# Patient Record
Sex: FEMALE | Race: WHITE | NOT HISPANIC OR LATINO | Employment: OTHER | URBAN - METROPOLITAN AREA
[De-identification: names, ages, dates, MRNs, and addresses within clinical notes are randomized per-mention and may not be internally consistent; named-entity substitution may affect disease eponyms.]

---

## 2018-06-09 ENCOUNTER — CLINICAL SUPPORT (OUTPATIENT)
Dept: OCCUPATIONAL MEDICINE | Facility: CLINIC | Age: 63
End: 2018-06-09

## 2018-06-09 PROCEDURE — 90715 TDAP VACCINE 7 YRS/> IM: CPT | Mod: S$GLB,,, | Performed by: FAMILY MEDICINE

## 2020-02-05 PROBLEM — E78.2 MIXED HYPERLIPIDEMIA: Status: ACTIVE | Noted: 2020-02-05

## 2020-02-05 PROBLEM — I10 MODERATE HYPERTENSION: Status: ACTIVE | Noted: 2020-02-05

## 2020-02-05 PROBLEM — Z79.899 ENCOUNTER FOR LONG-TERM (CURRENT) USE OF MEDICATIONS: Status: ACTIVE | Noted: 2020-02-05

## 2020-02-05 PROBLEM — Z00.00 WELL ADULT EXAM: Status: ACTIVE | Noted: 2020-02-05

## 2020-05-11 PROBLEM — Z00.00 WELL ADULT EXAM: Status: RESOLVED | Noted: 2020-02-05 | Resolved: 2020-05-11

## 2020-10-08 ENCOUNTER — OFFICE VISIT (OUTPATIENT)
Dept: URGENT CARE | Facility: CLINIC | Age: 65
End: 2020-10-08
Payer: COMMERCIAL

## 2020-10-08 VITALS
WEIGHT: 164 LBS | RESPIRATION RATE: 18 BRPM | TEMPERATURE: 97 F | OXYGEN SATURATION: 98 % | HEIGHT: 63 IN | DIASTOLIC BLOOD PRESSURE: 92 MMHG | SYSTOLIC BLOOD PRESSURE: 160 MMHG | HEART RATE: 93 BPM | BODY MASS INDEX: 29.06 KG/M2

## 2020-10-08 DIAGNOSIS — J02.9 SORE THROAT: Primary | ICD-10-CM

## 2020-10-08 LAB
CTP QC/QA: YES
SARS-COV-2 RDRP RESP QL NAA+PROBE: NEGATIVE

## 2020-10-08 PROCEDURE — 99214 PR OFFICE/OUTPT VISIT, EST, LEVL IV, 30-39 MIN: ICD-10-PCS | Mod: S$GLB,,, | Performed by: PHYSICIAN ASSISTANT

## 2020-10-08 PROCEDURE — U0002: ICD-10-PCS | Mod: QW,S$GLB,, | Performed by: PHYSICIAN ASSISTANT

## 2020-10-08 PROCEDURE — 99214 OFFICE O/P EST MOD 30 MIN: CPT | Mod: S$GLB,,, | Performed by: PHYSICIAN ASSISTANT

## 2020-10-08 PROCEDURE — U0002 COVID-19 LAB TEST NON-CDC: HCPCS | Mod: QW,S$GLB,, | Performed by: PHYSICIAN ASSISTANT

## 2020-10-08 RX ORDER — PREDNISONE 20 MG/1
20 TABLET ORAL DAILY
Qty: 4 TABLET | Refills: 0 | Status: SHIPPED | OUTPATIENT
Start: 2020-10-08 | End: 2020-10-08 | Stop reason: SDUPTHER

## 2020-10-08 RX ORDER — AZELASTINE 1 MG/ML
1 SPRAY, METERED NASAL 2 TIMES DAILY
Qty: 30 ML | Refills: 0 | Status: SHIPPED | OUTPATIENT
Start: 2020-10-08 | End: 2020-10-08 | Stop reason: SDUPTHER

## 2020-10-08 RX ORDER — AZELASTINE 1 MG/ML
1 SPRAY, METERED NASAL 2 TIMES DAILY
Qty: 30 ML | Refills: 0 | Status: SHIPPED | OUTPATIENT
Start: 2020-10-08 | End: 2021-05-31

## 2020-10-08 RX ORDER — PREDNISONE 20 MG/1
20 TABLET ORAL DAILY
Qty: 4 TABLET | Refills: 0 | Status: SHIPPED | OUTPATIENT
Start: 2020-10-08 | End: 2020-10-12

## 2020-10-08 NOTE — PROGRESS NOTES
"Subjective:       Patient ID: Martha Altman is a 65 y.o. female.    Vitals:  height is 5' 3" (1.6 m) and weight is 74.4 kg (164 lb). Her temperature is 96.8 °F (36 °C). Her blood pressure is 160/92 (abnormal) and her pulse is 93. Her respiration is 18 and oxygen saturation is 98%.     Chief Complaint: Sore Throat    Patient presents to clinic with complaints of a sore throat that started today.    Sore Throat   This is a new problem. The current episode started today. The problem has been unchanged. The pain is worse on the right side. There has been no fever. The pain is at a severity of 2/10. The pain is mild. Associated symptoms include congestion. Pertinent negatives include no abdominal pain, coughing, diarrhea, drooling, ear discharge, ear pain, headaches, hoarse voice, plugged ear sensation, neck pain, shortness of breath, stridor, swollen glands, trouble swallowing or vomiting. She has had no exposure to strep or mono. She has tried gargles (cough drops) for the symptoms. The treatment provided mild relief.       HENT: Positive for congestion and sore throat. Negative for ear pain, ear discharge, drooling and trouble swallowing.    Neck: Negative for neck pain.   Respiratory: Negative for cough, shortness of breath and stridor.    Gastrointestinal: Negative for abdominal pain, vomiting and diarrhea.   Neurological: Negative for headaches.       Objective:      Physical Exam   Constitutional: She is oriented to person, place, and time. She appears well-developed. She is cooperative.  Non-toxic appearance. She does not appear ill. No distress.   HENT:   Head: Normocephalic and atraumatic.   Ears:   Right Ear: Hearing, tympanic membrane, external ear and ear canal normal.   Left Ear: Hearing, tympanic membrane, external ear and ear canal normal.   Nose: Nose normal. No mucosal edema, rhinorrhea or nasal deformity. No epistaxis. Right sinus exhibits no maxillary sinus tenderness and no frontal sinus " tenderness. Left sinus exhibits no maxillary sinus tenderness and no frontal sinus tenderness.   Mouth/Throat: Uvula is midline and mucous membranes are normal. No trismus in the jaw. Normal dentition. No uvula swelling. Posterior oropharyngeal erythema (mild) and cobblestoning present. No oropharyngeal exudate or posterior oropharyngeal edema. No tonsillar exudate.       Eyes: Conjunctivae and lids are normal. No scleral icterus.   Neck: Trachea normal, full passive range of motion without pain and phonation normal. Neck supple. No neck rigidity. No edema and no erythema present.   Cardiovascular: Normal rate, regular rhythm, normal heart sounds and normal pulses.   Pulmonary/Chest: Effort normal and breath sounds normal. No respiratory distress. She has no decreased breath sounds. She has no rhonchi.   Abdominal: Normal appearance.   Musculoskeletal: Normal range of motion.         General: No deformity.   Neurological: She is alert and oriented to person, place, and time. She exhibits normal muscle tone. Coordination normal.   Skin: Skin is warm, dry, intact, not diaphoretic and not pale. Psychiatric: Her speech is normal and behavior is normal. Judgment and thought content normal.   Nursing note and vitals reviewed.    Office Visit on 10/08/2020   Component Date Value Ref Range Status    POC Rapid COVID 10/08/2020 Negative  Negative Final     Acceptable 10/08/2020 Yes   Final         Assessment:       1. Sore throat        Plan:       All hx was provided by the pt or available as part of established EMR. The pt past medical hx, family hx, social hx, and current medications were reviewed. Interpretation of diagnostics performed today were discussed. Tx options discussed. Quarantine recommendations based on CDC guidelines discussed. Pt to follow up with [facility] if no improvement or for any concern, and seek treatment in an ER for worsening. Pt voiced understanding of all discussed, and agreed with  decision making.    Sore throat  -     POCT COVID-19 Rapid Screening  -     Discontinue: azelastine (ASTELIN) 137 mcg (0.1 %) nasal spray; 1 spray (137 mcg total) by Nasal route 2 (two) times daily.  Dispense: 30 mL; Refill: 0  -     Discontinue: predniSONE (DELTASONE) 20 MG tablet; Take 1 tablet (20 mg total) by mouth once daily. for 4 days  Dispense: 4 tablet; Refill: 0  -     predniSONE (DELTASONE) 20 MG tablet; Take 1 tablet (20 mg total) by mouth once daily. for 4 days  Dispense: 4 tablet; Refill: 0  -     azelastine (ASTELIN) 137 mcg (0.1 %) nasal spray; 1 spray (137 mcg total) by Nasal route 2 (two) times daily.  Dispense: 30 mL; Refill: 0      Patient Instructions   · Follow up with your primary care if symptoms do not improve, or you may return here at any time.  · If you were referred to a specialist, please follow up with that specialty.  · If you were prescribed antibiotics, please take them to completion.  · If you were prescribed a narcotic or any medication with sedative effects, do not drive or operate heavy equipment or machinery while taking these medications.  · You must understand that you have received treatment at an Urgent Care facility only, and that you may be released before all of your medical problems are known or treated. Urgent Care facilities are not equipped to handle life threatening emergencies. It is recommended that you seek care at an Emergency Department for further evaluation of worsening or concerning symptoms, or possibly life threatening conditions as discussed.                                        If you  smoke, please stop smoking               PLEASE PRACTICE SOCIAL DISTANCING        Over the counter and home treatment of symptoms:  Alternate tylenol and motrin every 3 hours  Salt water gargles  Cold-eeze helps to reduce the duration of sore throat symptoms  Cepachol helps to numb the discomfort  Chloroseptic spray  Nasal saline spray reduces inflammation and  dryness  Warm face compresses as often as you can  Vicks vapor rub at night  Flonase OTC or Nasacort OTC  Simple foods like chicken noodle soup help  Pedialyte helps with dehydration if lacking appetite  Rest as much as you can

## 2020-10-08 NOTE — LETTER
October 8, 2020      [facility] [address], [address]  [place] [address]  Phone: [phone]  Fax: [phone]       Patient: Martha Altman   YOB: 1955  Date of Visit: 10/08/2020    To Whom It May Concern:    Edwige Altman  was at [facility] on 10/08/2020.     --IF Test results are NEGATIVE and NO known high risk exposure to covid-19 virus, can exclude from work/school until:  o MINIMUM OF 24-48 hours fever-free without the use of fever-reducing medications AND  o Improvement in symptoms (e.g. cough, shortness of breath, fatigue, GI symptoms, etc)     --IF YOU ARE BEING TESTED BECAUSE OF A HIGH RISK EXPOSURE, which the CDC defines as direct contact 6 feet or less for >15 minutes with a known positive person, you should follow CDC guidelines as well as your employer/school protocols for safely returning to work/school. Please be aware that there are False Negative possibilities with testing and you should return to work based upon CDC guidelines, not simply a negative result, unless your employer/school has a different RTW protocol/guidance for you.      If you have any questions or concerns, or if I can be of further assistance, please do not hesitate to contact me.    Sincerely,    Honorio Mcneill PA-C

## 2021-06-15 PROBLEM — M19.011 OSTEOARTHRITIS OF GLENOHUMERAL JOINT, RIGHT: Status: ACTIVE | Noted: 2021-06-15

## 2021-11-07 ENCOUNTER — OFFICE VISIT (OUTPATIENT)
Dept: URGENT CARE | Facility: CLINIC | Age: 66
End: 2021-11-07
Payer: COMMERCIAL

## 2021-11-07 VITALS
HEART RATE: 89 BPM | HEIGHT: 63 IN | RESPIRATION RATE: 16 BRPM | DIASTOLIC BLOOD PRESSURE: 73 MMHG | TEMPERATURE: 99 F | BODY MASS INDEX: 28.35 KG/M2 | SYSTOLIC BLOOD PRESSURE: 166 MMHG | OXYGEN SATURATION: 97 % | WEIGHT: 160 LBS

## 2021-11-07 DIAGNOSIS — J32.9 SINUSITIS, UNSPECIFIED CHRONICITY, UNSPECIFIED LOCATION: Primary | ICD-10-CM

## 2021-11-07 DIAGNOSIS — J02.9 SORE THROAT: ICD-10-CM

## 2021-11-07 LAB
CTP QC/QA: YES
SARS-COV-2 RDRP RESP QL NAA+PROBE: NEGATIVE

## 2021-11-07 PROCEDURE — 3044F HG A1C LEVEL LT 7.0%: CPT | Mod: CPTII,S$GLB,, | Performed by: PHYSICIAN ASSISTANT

## 2021-11-07 PROCEDURE — 1159F PR MEDICATION LIST DOCUMENTED IN MEDICAL RECORD: ICD-10-PCS | Mod: CPTII,S$GLB,, | Performed by: PHYSICIAN ASSISTANT

## 2021-11-07 PROCEDURE — U0002: ICD-10-PCS | Mod: QW,S$GLB,, | Performed by: PHYSICIAN ASSISTANT

## 2021-11-07 PROCEDURE — 3078F PR MOST RECENT DIASTOLIC BLOOD PRESSURE < 80 MM HG: ICD-10-PCS | Mod: CPTII,S$GLB,, | Performed by: PHYSICIAN ASSISTANT

## 2021-11-07 PROCEDURE — 1160F PR REVIEW ALL MEDS BY PRESCRIBER/CLIN PHARMACIST DOCUMENTED: ICD-10-PCS | Mod: CPTII,S$GLB,, | Performed by: PHYSICIAN ASSISTANT

## 2021-11-07 PROCEDURE — 3044F PR MOST RECENT HEMOGLOBIN A1C LEVEL <7.0%: ICD-10-PCS | Mod: CPTII,S$GLB,, | Performed by: PHYSICIAN ASSISTANT

## 2021-11-07 PROCEDURE — 3008F BODY MASS INDEX DOCD: CPT | Mod: CPTII,S$GLB,, | Performed by: PHYSICIAN ASSISTANT

## 2021-11-07 PROCEDURE — 99213 OFFICE O/P EST LOW 20 MIN: CPT | Mod: S$GLB,,, | Performed by: PHYSICIAN ASSISTANT

## 2021-11-07 PROCEDURE — U0002 COVID-19 LAB TEST NON-CDC: HCPCS | Mod: QW,S$GLB,, | Performed by: PHYSICIAN ASSISTANT

## 2021-11-07 PROCEDURE — 99213 PR OFFICE/OUTPT VISIT, EST, LEVL III, 20-29 MIN: ICD-10-PCS | Mod: S$GLB,,, | Performed by: PHYSICIAN ASSISTANT

## 2021-11-07 PROCEDURE — 3077F SYST BP >= 140 MM HG: CPT | Mod: CPTII,S$GLB,, | Performed by: PHYSICIAN ASSISTANT

## 2021-11-07 PROCEDURE — 1160F RVW MEDS BY RX/DR IN RCRD: CPT | Mod: CPTII,S$GLB,, | Performed by: PHYSICIAN ASSISTANT

## 2021-11-07 PROCEDURE — 1159F MED LIST DOCD IN RCRD: CPT | Mod: CPTII,S$GLB,, | Performed by: PHYSICIAN ASSISTANT

## 2021-11-07 PROCEDURE — 3008F PR BODY MASS INDEX (BMI) DOCUMENTED: ICD-10-PCS | Mod: CPTII,S$GLB,, | Performed by: PHYSICIAN ASSISTANT

## 2021-11-07 PROCEDURE — 3078F DIAST BP <80 MM HG: CPT | Mod: CPTII,S$GLB,, | Performed by: PHYSICIAN ASSISTANT

## 2021-11-07 PROCEDURE — 3077F PR MOST RECENT SYSTOLIC BLOOD PRESSURE >= 140 MM HG: ICD-10-PCS | Mod: CPTII,S$GLB,, | Performed by: PHYSICIAN ASSISTANT

## 2021-12-16 ENCOUNTER — IMMUNIZATION (OUTPATIENT)
Dept: FAMILY MEDICINE | Facility: CLINIC | Age: 66
End: 2021-12-16
Payer: COMMERCIAL

## 2021-12-16 DIAGNOSIS — Z23 NEED FOR VACCINATION: Primary | ICD-10-CM

## 2021-12-16 PROCEDURE — 0004A COVID-19, MRNA, LNP-S, PF, 30 MCG/0.3 ML DOSE VACCINE: CPT | Mod: PBBFAC | Performed by: FAMILY MEDICINE

## 2021-12-30 ENCOUNTER — PATIENT MESSAGE (OUTPATIENT)
Dept: ADMINISTRATIVE | Facility: OTHER | Age: 66
End: 2021-12-30
Payer: COMMERCIAL

## 2022-05-16 PROBLEM — U07.1 COVID-19: Status: ACTIVE | Noted: 2022-05-16

## 2022-12-15 ENCOUNTER — PATIENT MESSAGE (OUTPATIENT)
Dept: ADMINISTRATIVE | Facility: HOSPITAL | Age: 67
End: 2022-12-15

## 2022-12-16 ENCOUNTER — TELEPHONE (OUTPATIENT)
Dept: ADMINISTRATIVE | Facility: HOSPITAL | Age: 67
End: 2022-12-16

## 2022-12-16 VITALS — DIASTOLIC BLOOD PRESSURE: 59 MMHG | SYSTOLIC BLOOD PRESSURE: 136 MMHG

## 2024-01-23 PROBLEM — U07.1 COVID-19: Status: RESOLVED | Noted: 2022-05-16 | Resolved: 2024-01-23

## 2024-01-23 PROBLEM — G89.29 CHRONIC PAIN OF RIGHT KNEE: Status: ACTIVE | Noted: 2024-01-23

## 2024-01-23 PROBLEM — Z78.0 POSTMENOPAUSAL STATE: Status: ACTIVE | Noted: 2024-01-23

## 2024-01-23 PROBLEM — M85.80 OSTEOPENIA: Status: ACTIVE | Noted: 2024-01-23

## 2024-01-23 PROBLEM — M25.561 CHRONIC PAIN OF RIGHT KNEE: Status: ACTIVE | Noted: 2024-01-23

## 2024-04-30 ENCOUNTER — TELEPHONE (OUTPATIENT)
Dept: GASTROENTEROLOGY | Facility: CLINIC | Age: 69
End: 2024-04-30
Payer: MEDICARE

## 2024-04-30 NOTE — TELEPHONE ENCOUNTER
----- Message from Kaylie Myrick sent at 4/30/2024  3:34 PM CDT -----  Contact: self  Type:  Sooner Appointment Request    Caller is requesting a sooner appointment.  Caller declined first available appointment listed below.  Caller will not accept being placed on the waitlist and is requesting a message be sent to doctor.    Name of Caller:  pt  When is the first available appointment?  N/a  Symptoms:  gastro  Would the patient rather a call back or a response via [facility]? call  Best Call Back Number:  [phone]   Additional Information:  pt has a referral please call

## 2024-04-30 NOTE — TELEPHONE ENCOUNTER
Returned call to the patient, an appointment was set up for the patient, patient verbalized understanding of this.

## 2024-05-02 ENCOUNTER — OFFICE VISIT (OUTPATIENT)
Dept: GASTROENTEROLOGY | Facility: CLINIC | Age: 69
End: 2024-05-02
Payer: MEDICARE

## 2024-05-02 ENCOUNTER — HOSPITAL ENCOUNTER (OUTPATIENT)
Dept: RADIOLOGY | Facility: HOSPITAL | Age: 69
Discharge: HOME | End: 2024-05-02
Payer: MEDICARE

## 2024-05-02 VITALS — BODY MASS INDEX: 30.12 KG/M2 | HEIGHT: 63 IN | WEIGHT: 170 LBS

## 2024-05-02 DIAGNOSIS — R10.13 DYSPEPSIA: ICD-10-CM

## 2024-05-02 DIAGNOSIS — K59.00 CONSTIPATION, UNSPECIFIED CONSTIPATION TYPE: ICD-10-CM

## 2024-05-02 DIAGNOSIS — R10.30 LOWER ABDOMINAL PAIN: Primary | ICD-10-CM

## 2024-05-02 DIAGNOSIS — R10.9 ABDOMINAL CRAMPING: ICD-10-CM

## 2024-05-02 DIAGNOSIS — R19.4 CHANGE IN BOWEL HABITS: ICD-10-CM

## 2024-05-02 DIAGNOSIS — R10.30 LOWER ABDOMINAL PAIN: ICD-10-CM

## 2024-05-02 DIAGNOSIS — Z86.010 HISTORY OF COLON POLYPS: ICD-10-CM

## 2024-05-02 PROCEDURE — 1160F RVW MEDS BY RX/DR IN RCRD: CPT | Mod: CPTII,S$GLB,,

## 2024-05-02 PROCEDURE — 74018 RADEX ABDOMEN 1 VIEW: CPT | Mod: 26,,, | Performed by: STUDENT IN AN ORGANIZED HEALTH CARE EDUCATION/TRAINING PROGRAM

## 2024-05-02 PROCEDURE — 1126F AMNT PAIN NOTED NONE PRSNT: CPT | Mod: CPTII,S$GLB,,

## 2024-05-02 PROCEDURE — 74018 RADEX ABDOMEN 1 VIEW: CPT | Mod: TC,FY,PO

## 2024-05-02 PROCEDURE — 99204 OFFICE O/P NEW MOD 45 MIN: CPT | Mod: S$GLB,,,

## 2024-05-02 PROCEDURE — 1159F MED LIST DOCD IN RCRD: CPT | Mod: CPTII,S$GLB,,

## 2024-05-02 PROCEDURE — 3008F BODY MASS INDEX DOCD: CPT | Mod: CPTII,S$GLB,,

## 2024-05-02 PROCEDURE — 3288F FALL RISK ASSESSMENT DOCD: CPT | Mod: CPTII,S$GLB,,

## 2024-05-02 PROCEDURE — 1101F PT FALLS ASSESS-DOCD LE1/YR: CPT | Mod: CPTII,S$GLB,,

## 2024-05-02 PROCEDURE — 99999 PR PBB SHADOW E&M-EST. PATIENT-LVL V: CPT | Mod: PBBFAC,,,

## 2024-05-02 RX ORDER — PANTOPRAZOLE SODIUM 40 MG/1
40 TABLET, DELAYED RELEASE ORAL DAILY
Qty: 90 TABLET | Refills: 3 | Status: CANCELLED | OUTPATIENT
Start: 2024-05-02 | End: 2025-05-02

## 2024-05-02 NOTE — PROGRESS NOTES
Subjective:       Patient ID: Martha Altman is a 68 y.o. female Body mass index is 30.11 kg/m².    Chief Complaint: Abdominal Cramping and Constipation    This patient is new to me.  Former patient of Dr. Crocker.    Abdominal Pain  This is a chronic problem. The current episode started more than 1 month ago (started a couple months ago). The onset quality is gradual. The problem occurs daily. Duration: Varies in duration; usually lasts a couple of minutes. The problem has been waxing and waning (Has not experienced pain in the past 3 days). The pain is located in the suprapubic region, periumbilical region, LLQ and RLQ (Lower abdominal cramping and periumbilical discomfort). The pain is at a severity of 0/10 (Denies pain currently). The patient is experiencing no pain. The quality of the pain is cramping. The abdominal pain does not radiate. Associated symptoms include belching (occasional), constipation (Improved since starting Metamucil; currently having daily BMs rated 4 on [place] scale; prior to switching to Metamucil stools were rated 1 on [place] scale; hx of straining (resolved after starting Metamucil); at times did not feel complete after BMs), flatus (bloating - has improved since taking famotidine prior to dinner in the evenings) and nausea (Rare episodes of nausea, which she attributes to PND; drinks sprite with improvement). Pertinent negatives include no diarrhea, dysuria, fever, frequency, hematochezia, hematuria, melena, vomiting or weight loss. Exacerbated by: Gradually worsens as the day goes by towards the evening. The pain is relieved by Passing flatus and bowel movements. She has tried H2 blockers and antacids (Pepcid OTC daily, Tums p.r.n., and ib guard daily p.r.n.; patient has not started Bentyl yet) for the symptoms. The treatment provided significant relief. Prior diagnostic workup includes GI consult and CT scan. Her past medical history is significant for GERD (Intermittent dyspepsia  that improves after taking Tums p.r.n. most days; risk factors: Takes aspirin 81 mg daily and meloxicam daily p.r.n.) and irritable bowel syndrome (Patient reports history of IBS diagnosed by Dr. Crocker). There is no history of abdominal surgery, colon cancer, Crohn's disease, gallstones, pancreatitis, PUD or ulcerative colitis. Patient's medical history does not include kidney stones and UTI.     Review of Systems   Constitutional:  Negative for activity change, appetite change, chills, diaphoresis, fatigue, fever, unexpected weight change and weight loss.   HENT:  Negative for sore throat and trouble swallowing.    Respiratory:  Negative for cough, choking and shortness of breath.    Cardiovascular:  Negative for chest pain.   Gastrointestinal:  Positive for abdominal pain, constipation (Improved since starting Metamucil; currently having daily BMs rated 4 on [place] scale; prior to switching to Metamucil stools were rated 1 on [place] scale; hx of straining (resolved after starting Metamucil); at times did not feel complete after BMs), flatus (bloating - has improved since taking famotidine prior to dinner in the evenings) and nausea (Rare episodes of nausea, which she attributes to PND; drinks sprite with improvement). Negative for abdominal distention, anal bleeding, blood in stool, diarrhea, hematochezia, melena, rectal pain and vomiting.   Genitourinary:  Negative for dysuria, frequency and hematuria.       No LMP recorded. Patient has had a hysterectomy.  Past Medical History:   Diagnosis Date    Anxiety     Breast cyst      Past Surgical History:   Procedure Laterality Date    COLONOSCOPY  09/26/2019        HYSTERECTOMY  1990    OOPHORECTOMY Bilateral 1990     Family History   Problem Relation Name Age of Onset    Hypertension Mother      Hyperlipidemia Mother      Heart disease Father      Hypertension Father      Hypertension Brother      Hyperlipidemia Brother      Hypertension Brother       Hyperlipidemia Brother      Hypertension Brother      Hyperlipidemia Brother      Inflammatory bowel disease Cousin      Breast cancer Cousin      Colon cancer Neg Hx      Esophageal cancer Neg Hx      Stomach cancer Neg Hx       Social History     Tobacco Use    Smoking status: Never    Smokeless tobacco: Never   Substance Use Topics    Alcohol use: Yes     Alcohol/week: 2.0 standard drinks of alcohol     Types: 2 Glasses of wine per week    Drug use: Never     Wt Readings from Last 10 Encounters:   05/02/24 77.1 kg (169 lb 15.6 oz)   04/30/24 78 kg (172 lb)   01/23/24 76.2 kg (167 lb 14.4 oz)   10/19/23 75.5 kg (166 lb 7.2 oz)   07/10/23 75.4 kg (166 lb 5 oz)   02/27/23 77.1 kg (169 lb 14.4 oz)   01/17/23 77.3 kg (170 lb 6.4 oz)   11/28/22 77.2 kg (170 lb 4.8 oz)   07/14/22 76.1 kg (167 lb 11.2 oz)   03/16/22 74.9 kg (165 lb 3.2 oz)     Lab Results   Component Value Date    WBC 5.36 02/27/2024    HGB 13.1 02/27/2024    HCT 40.1 02/27/2024    MCV 92 02/27/2024     02/27/2024     CMP  Sodium   Date Value Ref Range Status   03/07/2024 139 136 - 145 mmol/L Final     Potassium   Date Value Ref Range Status   03/07/2024 4.4 3.5 - 5.1 mmol/L Final     Comment:     Anion Gap reference range revised on 4/28/2023     Chloride   Date Value Ref Range Status   03/07/2024 103 95 - 110 mmol/L Final     CO2   Date Value Ref Range Status   03/07/2024 29 22 - 31 mmol/L Final     Glucose   Date Value Ref Range Status   03/07/2024 94 70 - 110 mg/dL Final     Comment:     The ADA recommends the following guidelines for fasting glucose:    Normal:       less than 100 mg/dL    Prediabetes:  100 mg/dL to 125 mg/dL    Diabetes:     126 mg/dL or higher       BUN   Date Value Ref Range Status   03/07/2024 19 (H) 7 - 18 mg/dL Final     Creatinine   Date Value Ref Range Status   03/07/2024 0.72 0.50 - 1.40 mg/dL Final     Calcium   Date Value Ref Range Status   03/07/2024 10.1 8.4 - 10.2 mg/dL Final     Total Protein   Date Value Ref  Range Status   02/27/2024 6.8 6.0 - 8.4 g/dL Final     Albumin   Date Value Ref Range Status   02/27/2024 4.3 3.5 - 5.2 g/dL Final     Total Bilirubin   Date Value Ref Range Status   02/27/2024 0.6 0.2 - 1.3 mg/dL Final     Alkaline Phosphatase   Date Value Ref Range Status   02/27/2024 80 38 - 145 U/L Final     AST   Date Value Ref Range Status   02/27/2024 23 14 - 36 U/L Final     ALT   Date Value Ref Range Status   02/27/2024 25 0 - 35 U/L Final     Anion Gap   Date Value Ref Range Status   03/07/2024 7 5 - 12 mmol/L Final     Comment:     Anion Gap reference range revised on 4/28/2023     eGFR if    Date Value Ref Range Status   07/20/2022 >60 >60 mL/min/1.73 m^2 Final     eGFR if non    Date Value Ref Range Status   07/20/2022 >60 >60 mL/min/1.73 m^2 Final     Comment:     Calculation used to obtain the estimated glomerular filtration  rate (eGFR) is the CKD-EPI equation.        Lab Results   Component Value Date    TSH 2.020 02/27/2024     Reviewed prior medical records including radiology report of CT abdomen and pelvis 06/04/2020, KUB 05/02/2024 & endoscopy history (see surgical history).    Objective:      Physical Exam  Vitals and nursing note reviewed.   Constitutional:       General: She is not in acute distress.     Appearance: Normal appearance. She is obese. She is not ill-appearing.   HENT:      Mouth/Throat:      Lips: Pink. No lesions.   Cardiovascular:      Rate and Rhythm: Normal rate.      Heart sounds: Normal heart sounds.   Pulmonary:      Effort: Pulmonary effort is normal.      Breath sounds: Normal breath sounds.   Abdominal:      General: Bowel sounds are normal. There is no distension or abdominal bruit. There are no signs of injury.      Palpations: Abdomen is soft. There is no shifting dullness, fluid wave, hepatomegaly, splenomegaly or mass.      Tenderness: There is no abdominal tenderness. There is no guarding or rebound. Negative signs include  Foreman's sign, Rovsing's sign and McBurney's sign.   Skin:     General: Skin is warm and dry.      Coloration: Skin is not jaundiced or pale.   Neurological:      Mental Status: She is alert and oriented to person, place, and time.   Psychiatric:         Attention and Perception: Attention normal.         Mood and Affect: Mood normal.         Speech: Speech normal.         Behavior: Behavior normal.         Assessment:       1. Lower abdominal pain    2. Abdominal cramping    3. Constipation, unspecified constipation type    4. Change in bowel habits    5. Dyspepsia    6. History of colon polyps        Plan:       Lower abdominal pain & Abdominal cramping  - schedule Colonoscopy, discussed procedure with the patient, including risks and benefits, patient verbalized understanding  -     X-Ray Abdomen AP 1 View; Future; Expected date: 05/02/2024  - recommend OTC simethicone as directed, such as Phazyme or Gas-x  - recommend low gas diet: Reduce or eliminate these foods from your diet: Broccoli, Cauliflower, [place] sprouts, Cabbage, Cooked dried beans, Carbonated beverages (sparkling water, soda, beer, champagne)  Other Causes Of Excess Gas Include:   1) EATING TOO FAST or TALKING WHILE YOU CHEW may cause you to swallow air. This increases the amount of gas in the stomach and may worsen your symptoms.  --> Chew each mouthful completely before swallowing. Take your time.  2) OVEREATING may increase the feeling of being bloated and cause more gas.  --> When you are full, stop eating.  3) CONSTIPATION can increase the amount of normal intestinal gas.  --> Avoid constipation by increasing the amount of fiber in your diet by including whole cereal grains, fresh vegetables (except those in the above list) and fresh fruits. High-fiber foods absorb water and carry it out of the body. When increasing the amount of fiber in your diet, you also need to increase the amount of water that you drink. You should drink at least eight  8-ounce glasses of water (two quarts) per day.  - continue Ibgard OTC as directed on packaging  - may take Bentyl as prescribed for abdominal pain, but caution due to possible worsening constipation    Constipation, unspecified constipation type & Change in bowel habits  - schedule Colonoscopy, discussed procedure with the patient, including risks and benefits, patient verbalized understanding  -Recommend daily exercise as tolerated, adequate water intake (six 8-oz glasses of water daily), and high fiber diet.   - continue Metamucil and align as directed on packaging  - may try OTC MiraLax once daily (17g PO) as directed  -If no improvement with above recommendations, try intermittently dosed Dulcolax OTC as directed (every 3-4 days) PRN to facilitate bowel movements  -If no relief with this, consider adding a emollient laxative (castor oil or mineral oil) +/- enema  -If still no improvement with these measures, call/follow-up    Dyspepsia  - discussed about EGD, including the risks and benefits of EGD, patient verbalized understanding but patient declined EGD at this time.  -Avoid large meals, avoid eating within 2-3 hours of bedtime (avoid late night eating & lying down soon after eating), elevate head of bed if nocturnal symptoms are present, smoking cessation (if current smoker), & weight loss (if overweight).   -Avoid known foods which trigger reflux symptoms & to minimize/avoid high-fat foods, chocolate, caffeine, citrus, alcohol, & tomato products.  -Avoid/limit use of NSAID's, since they can cause GI upset, bleeding, and/or ulcers. If needed, take with food.  - may continue Tums p.r.n.  - continue Pepcid once daily    History of colon polyps  - schedule Colonoscopy, discussed procedure with the patient, including risks and benefits, patient verbalized understanding    Follow up in about 4 weeks (around 5/30/2024), or if symptoms worsen or fail to improve.      If no improvement in symptoms or symptoms  worsen, call/follow-up at clinic or go to ER.        Total time spent on the encounter includes face to face time and non-face to face time preparing to see the patient (eg, review of tests), Obtaining and/or reviewing separately obtained history, Documenting clinical information in the electronic or other health record, Independently interpreting results (not separately reported) and communicating results to the patient/family/caregiver, or Care coordination (not separately reported).     A dictation software program was used for this note. Please expect some simple typographical  errors in this note.

## 2024-05-28 ENCOUNTER — TELEPHONE (OUTPATIENT)
Dept: GASTROENTEROLOGY | Facility: CLINIC | Age: 69
End: 2024-05-28
Payer: MEDICARE

## 2024-05-28 NOTE — TELEPHONE ENCOUNTER
Returned call to the patient, next available date offered to the patient, patient decided to leave procedure as scheduled.

## 2024-05-28 NOTE — TELEPHONE ENCOUNTER
----- Message from Kaylie Myrick sent at 5/28/2024 10:53 AM CDT -----  Contact: self  Type: Needs Medical Advice  Who Called:  pt  Best Call Back Number: [phone]   Additional Information: pt would like to move colonoscopy back a week please call

## 2024-08-21 ENCOUNTER — TELEPHONE (OUTPATIENT)
Dept: GASTROENTEROLOGY | Facility: CLINIC | Age: 69
End: 2024-08-21
Payer: MEDICARE

## 2024-08-21 NOTE — TELEPHONE ENCOUNTER
----- Message from Courtney Greene sent at 8/21/2024 10:27 AM CDT -----  Contact: Patient  Type:  Needs Medical Advice    Who Called:   Patient    Would the patient rather a call back or a response via [facility]?   Call back  Best Call Back Number:   [phone]    Additional Information:   States she would like to speak with someone about her colonoscopy tomorrow (8/22) - states she has questions about the prep - please call - thank you

## 2024-08-21 NOTE — H&P
History & Physical - Short Stay  Gastroenterology      SUBJECTIVE:     Procedure: Colonoscopy    Chief Complaint/Indication for Procedure: Lower abdo pains.    History of Present Illness:  See recent GI OV notez:  Office Visit   5/2/2024  [facility]       Rachna Neff, NP  Gastroenterology Lower abdominal pain +5 more  Dx Abdominal Cramping  Constipation; Referred by Self, Aaareferral  Reason for Visit     Progress Notes    Rachna Neff NP at 5/2/2024 10:30 AM    Status: Signed   Expand All Collapse All  Subjective:         Subjective  Patient ID: Martha Altman is a 68 y.o. female Body mass index is 30.11 kg/m².     Chief Complaint: Abdominal Cramping and Constipation     This patient is new to me.  Former patient of Dr. Crocker.     Abdominal Pain  This is a chronic problem. The current episode started more than 1 month ago (started a couple months ago). The onset quality is gradual. The problem occurs daily. Duration: Varies in duration; usually lasts a couple of minutes. The problem has been waxing and waning (Has not experienced pain in the past 3 days). The pain is located in the suprapubic region, periumbilical region, LLQ and RLQ (Lower abdominal cramping and periumbilical discomfort). The pain is at a severity of 0/10 (Denies pain currently). The patient is experiencing no pain. The quality of the pain is cramping. The abdominal pain does not radiate. Associated symptoms include belching (occasional), constipation (Improved since starting Metamucil; currently having daily BMs rated 4 on [place] scale; prior to switching to Metamucil stools were rated 1 on [place] scale; hx of straining (resolved after starting Metamucil); at times did not feel complete after BMs), flatus (bloating - has improved since taking famotidine prior to dinner in the evenings) and nausea (Rare episodes of nausea, which she attributes to PND; drinks sprite with improvement). Pertinent negatives  include no diarrhea, dysuria, fever, frequency, hematochezia, hematuria, melena, vomiting or weight loss. Exacerbated by: Gradually worsens as the day goes by towards the evening. The pain is relieved by Passing flatus and bowel movements. She has tried H2 blockers and antacids (Pepcid OTC daily, Tums p.r.n., and ib guard daily p.r.n.; patient has not started Bentyl yet) for the symptoms. The treatment provided significant relief. Prior diagnostic workup includes GI consult and CT scan. Her past medical history is significant for GERD (Intermittent dyspepsia that improves after taking Tums p.r.n. most days; risk factors: Takes aspirin 81 mg daily and meloxicam daily p.r.n.) and irritable bowel syndrome (Patient reports history of IBS diagnosed by Dr. Crocker). There is no history of abdominal surgery, colon cancer, Crohn's disease, gallstones, pancreatitis, PUD or ulcerative colitis. Patient's medical history does not include kidney stones and UTI.       Last Colonoscopy 9/2019, [facility].  Several polyps removed.     Review of Systems   Constitutional:  Negative for activity change, appetite change, chills, diaphoresis, fatigue, fever, unexpected weight change and weight loss.   HENT:  Negative for sore throat and trouble swallowing.    Respiratory:  Negative for cough, choking and shortness of breath.    Cardiovascular:  Negative for chest pain.   Gastrointestinal:  Positive for abdominal pain, constipation (Improved since starting Metamucil; currently having daily BMs rated 4 on [place] scale; prior to switching to Metamucil stools were rated 1 on [place] scale; hx of straining (resolved after starting Metamucil); at times did not feel complete after BMs), flatus (bloating - has improved since taking famotidine prior to dinner in the evenings) and nausea (Rare episodes of nausea, which she attributes to PND; drinks sprite with improvement). Negative for abdominal distention, anal bleeding, blood in stool, diarrhea,  hematochezia, melena, rectal pain and vomiting.       Assessment:      Assessment  1. Lower abdominal pain    2. Abdominal cramping    3. Constipation, unspecified constipation type    4. Change in bowel habits    5. Dyspepsia    6. History of colon polyps          Plan:      Plan  Lower abdominal pain & Abdominal cramping  - schedule Colonoscopy, discussed procedure with the patient, including risks and benefits, patient verbalized understanding  -     X-Ray Abdomen AP 1 View; Future; Expected date: 05/02/2024  - recommend OTC simethicone as directed, such as Phazyme or Gas-x  - recommend low gas diet: Reduce or eliminate these foods from your diet: Broccoli, Cauliflower, [place] sprouts, Cabbage, Cooked dried beans, Carbonated beverages (sparkling water, soda, beer, champagne)  Other Causes Of Excess Gas Include:   1) EATING TOO FAST or TALKING WHILE YOU CHEW may cause you to swallow air. This increases the amount of gas in the stomach and may worsen your symptoms.  --> Chew each mouthful completely before swallowing. Take your time.  2) OVEREATING may increase the feeling of being bloated and cause more gas.  --> When you are full, stop eating.  3) CONSTIPATION can increase the amount of normal intestinal gas.  --> Avoid constipation by increasing the amount of fiber in your diet by including whole cereal grains, fresh vegetables (except those in the above list) and fresh fruits. High-fiber foods absorb water and carry it out of the body. When increasing the amount of fiber in your diet, you also need to increase the amount of water that you drink. You should drink at least eight 8-ounce glasses of water (two quarts) per day.  - continue Ibgard OTC as directed on packaging  - may take Bentyl as prescribed for abdominal pain, but caution due to possible worsening constipation     Constipation, unspecified constipation type & Change in bowel habits  - schedule Colonoscopy, discussed procedure with the patient,  including risks and benefits, patient verbalized understanding  -Recommend daily exercise as tolerated, adequate water intake (six 8-oz glasses of water daily), and high fiber diet.   - continue Metamucil and align as directed on packaging  - may try OTC MiraLax once daily (17g PO) as directed  -If no improvement with above recommendations, try intermittently dosed Dulcolax OTC as directed (every 3-4 days) PRN to facilitate bowel movements  -If no relief with this, consider adding a emollient laxative (castor oil or mineral oil) +/- enema  -If still no improvement with these measures, call/follow-up     Dyspepsia  - discussed about EGD, including the risks and benefits of EGD, patient verbalized understanding but patient declined EGD at this time.  -Avoid large meals, avoid eating within 2-3 hours of bedtime (avoid late night eating & lying down soon after eating), elevate head of bed if nocturnal symptoms are present, smoking cessation (if current smoker), & weight loss (if overweight).   -Avoid known foods which trigger reflux symptoms & to minimize/avoid high-fat foods, chocolate, caffeine, citrus, alcohol, & tomato products.  -Avoid/limit use of NSAID's, since they can cause GI upset, bleeding, and/or ulcers. If needed, take with food.  - may continue Tums p.r.n.  - continue Pepcid once daily     History of colon polyps  - schedule Colonoscopy, discussed procedure with the patient, including risks and benefits, patient verbalized understanding     Follow up in about 4 weeks (around 5/30/2024), or if symptoms worsen or fail to improve.                 PTA Medications   Medication Sig    aspirin (ECOTRIN) 81 MG EC tablet Take 81 mg by mouth once daily.    calcium carbonate (OS-DELIA) 600 mg calcium (1,500 mg) Tab Take 600 mg by mouth once.    diltiaZEM (TIAZAC) 240 MG Cs24 Take 1 capsule (240 mg total) by mouth once daily.    famotidine (PEPCID ORAL) Take by mouth.    LORazepam (ATIVAN) 0.5 MG tablet Take 1 tablet  (0.5 mg total) by mouth every 12 (twelve) hours as needed (anxiety with travel).    meloxicam (MOBIC) 15 MG tablet TAKE 1 TABLET(15 MG) BY MOUTH DAILY    multivit with calcium,iron,min (WOMEN'S MULTIPLE VITAMINS ORAL) Take by mouth once daily.    olmesartan-hydrochlorothiazide (BENICAR HCT) 20-12.5 mg per tablet Take 1 tablet by mouth once daily.    peppermint oil (IBGARD ORAL) Take by mouth.    rosuvastatin (CRESTOR) 20 MG tablet Take 1 tablet (20 mg total) by mouth once daily.    vit A/vit C/vit E/zinc/copper (PRESERVISION AREDS ORAL) Take by mouth once daily.    vitamin D (VITAMIN D3) 1000 units Tab Take 1,000 Units by mouth once daily.    Bifidobacterium infantis (ALIGN ORAL) Take by mouth.    estradioL (ESTRACE) 0.01 % (0.1 mg/gram) vaginal cream SMARTSIG:Sparingly Vaginal Every Night    psyllium husk (METAMUCIL ORAL) Take by mouth.    UNABLE TO FIND medication name:  guard       Review of patient's allergies indicates:  No Known Allergies     Past Medical History:   Diagnosis Date    Anxiety     Breast cyst     Essential (primary) hypertension     Gastro-esophageal reflux disease without esophagitis     Mixed hyperlipidemia      Past Surgical History:   Procedure Laterality Date    COLONOSCOPY  09/26/2019        HYSTERECTOMY  1990    OOPHORECTOMY Bilateral 1990     Family History   Problem Relation Name Age of Onset    Hypertension Mother      Hyperlipidemia Mother      Heart disease Father      Hypertension Father      Hypertension Brother      Hyperlipidemia Brother      Hypertension Brother      Hyperlipidemia Brother      Hypertension Brother      Hyperlipidemia Brother      Inflammatory bowel disease Cousin      Breast cancer Cousin      Colon cancer Neg Hx      Esophageal cancer Neg Hx      Stomach cancer Neg Hx       Social History     Tobacco Use    Smoking status: Never    Smokeless tobacco: Never   Substance Use Topics    Alcohol use: Yes     Alcohol/week: 2.0 standard drinks of alcohol      "Types: 2 Glasses of wine per week    Drug use: Never         OBJECTIVE:     Vital Signs (Most Recent)  Temp: 98.1 °F (36.7 °C) (08/22/24 0836)  Pulse: 106 (08/22/24 0836)  Resp: 16 (08/22/24 0836)  BP: (!) 140/76 (08/22/24 0836)  SpO2: 99 % (08/22/24 0836)    Physical Exam:  : Ht: 5' 3" (160 cm)   Wt: 76.7 kg   BMI: 29.94 kg/m² .                                                       GENERAL:  Comfortable, in no acute distress.                                 HEENT EXAM:  Nonicteric.  No adenopathy.  Oropharynx is clear.               NECK:  Supple.                                                               LUNGS:  Clear.                                                               CARDIAC:  Regular rate and rhythm.  S1, S2.  No murmur.                      ABDOMEN:  Soft, positive bowel sounds, nontender.  No hepatosplenomegaly or masses.  No rebound or guarding.                                             EXTREMITIES:  No edema.     MENTAL STATUS:  Alert and oriented.    ASSESSMENT/PLAN:     Assessment: Lower abdo pains.  Hx of colon polyps.    Plan: Colonoscopy    Anesthesia Plan:   MAC / General Anaesthesia    ASA Grade: ASA 2 - Patient with mild systemic disease with no functional limitations    MALLAMPATI SCORE: II (hard and soft palate, upper portion of tonsils anduvula visible)    "

## 2024-08-22 ENCOUNTER — ANESTHESIA (OUTPATIENT)
Dept: ENDOSCOPY | Facility: HOSPITAL | Age: 69
End: 2024-08-22
Payer: MEDICARE

## 2024-08-22 ENCOUNTER — HOSPITAL ENCOUNTER (OUTPATIENT)
Facility: HOSPITAL | Age: 69
Discharge: HOME | End: 2024-08-22
Attending: INTERNAL MEDICINE | Admitting: INTERNAL MEDICINE
Payer: MEDICARE

## 2024-08-22 ENCOUNTER — ANESTHESIA EVENT (OUTPATIENT)
Dept: ENDOSCOPY | Facility: HOSPITAL | Age: 69
End: 2024-08-22
Payer: MEDICARE

## 2024-08-22 VITALS
RESPIRATION RATE: 16 BRPM | TEMPERATURE: 98 F | HEART RATE: 70 BPM | HEIGHT: 63 IN | SYSTOLIC BLOOD PRESSURE: 146 MMHG | DIASTOLIC BLOOD PRESSURE: 66 MMHG | BODY MASS INDEX: 29.95 KG/M2 | WEIGHT: 169 LBS | OXYGEN SATURATION: 98 %

## 2024-08-22 DIAGNOSIS — R10.30 LOWER ABDOMINAL PAIN: ICD-10-CM

## 2024-08-22 PROCEDURE — 45378 DIAGNOSTIC COLONOSCOPY: CPT | Mod: ,,, | Performed by: INTERNAL MEDICINE

## 2024-08-22 PROCEDURE — 25000003 PHARM REV CODE 250: Mod: PO | Performed by: NURSE ANESTHETIST, CERTIFIED REGISTERED

## 2024-08-22 PROCEDURE — 37000009 HC ANESTHESIA EA ADD 15 MINS: Mod: PO | Performed by: INTERNAL MEDICINE

## 2024-08-22 PROCEDURE — 63600175 PHARM REV CODE 636 W HCPCS: Mod: PO | Performed by: INTERNAL MEDICINE

## 2024-08-22 PROCEDURE — 37000008 HC ANESTHESIA 1ST 15 MINUTES: Mod: PO | Performed by: INTERNAL MEDICINE

## 2024-08-22 PROCEDURE — 45378 DIAGNOSTIC COLONOSCOPY: CPT | Mod: PO | Performed by: INTERNAL MEDICINE

## 2024-08-22 PROCEDURE — 63600175 PHARM REV CODE 636 W HCPCS: Mod: PO | Performed by: NURSE ANESTHETIST, CERTIFIED REGISTERED

## 2024-08-22 RX ORDER — LIDOCAINE HYDROCHLORIDE 20 MG/ML
INJECTION INTRAVENOUS
Status: DISCONTINUED | OUTPATIENT
Start: 2024-08-22 | End: 2024-08-22

## 2024-08-22 RX ORDER — PROPOFOL 10 MG/ML
VIAL (ML) INTRAVENOUS CONTINUOUS PRN
Status: DISCONTINUED | OUTPATIENT
Start: 2024-08-22 | End: 2024-08-22

## 2024-08-22 RX ORDER — SODIUM CHLORIDE, SODIUM LACTATE, POTASSIUM CHLORIDE, CALCIUM CHLORIDE 600; 310; 30; 20 MG/100ML; MG/100ML; MG/100ML; MG/100ML
INJECTION, SOLUTION INTRAVENOUS CONTINUOUS
Status: DISCONTINUED | OUTPATIENT
Start: 2024-08-22 | End: 2024-08-22 | Stop reason: HOSPADM

## 2024-08-22 RX ORDER — DICYCLOMINE HYDROCHLORIDE 10 MG/1
10 CAPSULE ORAL
Qty: 120 CAPSULE | Refills: 11 | Status: SHIPPED | OUTPATIENT
Start: 2024-08-22 | End: 2025-08-22

## 2024-08-22 RX ORDER — PROPOFOL 10 MG/ML
VIAL (ML) INTRAVENOUS
Status: DISCONTINUED | OUTPATIENT
Start: 2024-08-22 | End: 2024-08-22

## 2024-08-22 RX ORDER — SODIUM CHLORIDE 0.9 % (FLUSH) 0.9 %
10 SYRINGE (ML) INJECTION
Status: DISCONTINUED | OUTPATIENT
Start: 2024-08-22 | End: 2024-08-22 | Stop reason: HOSPADM

## 2024-08-22 RX ADMIN — PROPOFOL 150 MCG/KG/MIN: 10 INJECTION, EMULSION INTRAVENOUS at 09:08

## 2024-08-22 RX ADMIN — SODIUM CHLORIDE, POTASSIUM CHLORIDE, SODIUM LACTATE AND CALCIUM CHLORIDE: 600; 310; 30; 20 INJECTION, SOLUTION INTRAVENOUS at 08:08

## 2024-08-22 RX ADMIN — PROPOFOL 100 MG: 10 INJECTION, EMULSION INTRAVENOUS at 09:08

## 2024-08-22 RX ADMIN — LIDOCAINE HYDROCHLORIDE 100 MG: 20 INJECTION INTRAVENOUS at 09:08

## 2024-08-22 NOTE — BRIEF OP NOTE
Discharge Note  Short Stay      SUMMARY     Admit Date: 8/22/2024    Attending Physician: David Mo Jr., MD     Discharge Physician: David Mo Jr., MD    Discharge Date: 8/22/2024 10:42 AM    Final Diagnosis: Hx of constipation [Z87.19]  Abdominal cramping [R10.9]  Lower abdominal pain [R10.30]      Impression:            - Diverticulosis in the proximal sigmoid colon.                          - Mild colonic spasm consistent with irritable                          bowel syndrome.                          - Tortuous colon.                          - Redundant colon.                          - Non-bleeding internal hemorrhoids.                          - The examination was otherwise normal.                          - The examined portion of the ileum was normal.                          - No specimens collected.   Recommendation:        - Discharge patient to home.                          - High fiber diet.                          - Use fiber, for example Citrucel, Fibercon,                          Konsyl or Metamucil. Or Fiber Gummies.                          - Augmented water consumption diet.                          - Take a PROBIOTIC, such as ALIGN or CULTURELLE or                          PADMA-Q (all non-prescription), every day for a                          month.                          - Miralax 1 capful (17 grams) in 8 ounces of water                          PO daily PRN.                          - Repeat colonoscopy in 5 years for surveillance.                          - Continue present medications.                          - Patient has a contact number available for                          emergencies. The signs and symptoms of potential                          delayed complications were discussed with the                          patient. Return to normal activities tomorrow.                          Written discharge instructions were provided to                          the  patient.                          - Return to normal activities tomorrow.   David Mo MD   8/22/2024       Disposition: HOME OR SELF CARE    Patient Instructions:   Current Discharge Medication List        START taking these medications    Details   dicyclomine (BENTYL) 10 MG capsule Take 1 capsule (10 mg total) by mouth 4 (four) times daily before meals and nightly. , to ease colon spasms and gas pockets.  Qty: 120 capsule, Refills: 11           CONTINUE these medications which have NOT CHANGED    Details   aspirin (ECOTRIN) 81 MG EC tablet Take 81 mg by mouth once daily.      calcium carbonate (OS-DELIA) 600 mg calcium (1,500 mg) Tab Take 600 mg by mouth once.      diltiaZEM (TIAZAC) 240 MG Cs24 Take 1 capsule (240 mg total) by mouth once daily.  Qty: 90 capsule, Refills: 1    Associated Diagnoses: Moderate hypertension      famotidine (PEPCID ORAL) Take by mouth.      LORazepam (ATIVAN) 0.5 MG tablet Take 1 tablet (0.5 mg total) by mouth every 12 (twelve) hours as needed (anxiety with travel).  Qty: 10 tablet, Refills: 0    Associated Diagnoses: Anxiety with flying      meloxicam (MOBIC) 15 MG tablet TAKE 1 TABLET(15 MG) BY MOUTH DAILY  Qty: 30 tablet, Refills: 1      multivit with calcium,iron,min (WOMEN'S MULTIPLE VITAMINS ORAL) Take by mouth once daily.      olmesartan-hydrochlorothiazide (BENICAR HCT) 20-12.5 mg per tablet Take 1 tablet by mouth once daily.  Qty: 90 tablet, Refills: 1    Comments: .  Associated Diagnoses: Moderate hypertension      peppermint oil (IBGARD ORAL) Take by mouth.      rosuvastatin (CRESTOR) 20 MG tablet Take 1 tablet (20 mg total) by mouth once daily.  Qty: 90 tablet, Refills: 1    Associated Diagnoses: Mixed hyperlipidemia      vit A/vit C/vit E/zinc/copper (PRESERVISION AREDS ORAL) Take by mouth once daily.      vitamin D (VITAMIN D3) 1000 units Tab Take 1,000 Units by mouth once daily.      Bifidobacterium infantis (ALIGN ORAL) Take by mouth.      estradioL (ESTRACE)  0.01 % (0.1 mg/gram) vaginal cream SMARTSIG:Sparingly Vaginal Every Night      psyllium husk (METAMUCIL ORAL) Take by mouth.      UNABLE TO FIND medication name: IB guard             Discharge Procedure Orders (must include Diet, Follow-up, Activity)    Follow Up:  Follow up with PCP as per your routine.  Please follow a high fiber diet.  Activity as tolerated.    No driving day of procedure.    PROBIOTICS:  Now that your colon is so cleaned out, now is a good time for a round of PROBIOTICS.  Take a Probiotic product such as Align or Culturelle or Tati-Q, every day for a month.                  (The products listed are non-prescription, but you may need to ask the pharmacist for their location.)  Repeat this at least 5-6 times a year.

## 2024-08-22 NOTE — PLAN OF CARE
Plan of care discussed with patient and friend. All questions and concerns addressed and answered. Free of pain or distress. PIV removed without complications. VS stable. Procedure report and discharge instructions gone over and patient aware of restrictions and when to resume medications. Patient in agreement.    [address] at bedside

## 2024-08-22 NOTE — TRANSFER OF CARE
"Anesthesia Transfer of Care Note    Patient: Martha Altman    Procedure(s) Performed: Procedure(s) (LRB):  COLONOSCOPY (N/A)    Patient location: PACU    Anesthesia Type: general    Transport from OR: Transported from OR on room air with adequate spontaneous ventilation    Post pain: adequate analgesia    Post assessment: no apparent anesthetic complications and tolerated procedure well    Post vital signs: stable    Level of consciousness: responds to stimulation    Nausea/Vomiting: no nausea/vomiting    Complications: none    Transfer of care protocol was followed    Last vitals: Visit Vitals  BP (!) 144/76   Pulse 62   Temp 36.7 °C (98.1 °F) (Temporal)   Resp (!) 9   Ht 5' 3" (1.6 m)   Wt 76.7 kg (169 lb)   SpO2 97%   Breastfeeding No   BMI 29.94 kg/m²     "

## 2024-08-22 NOTE — ANESTHESIA PREPROCEDURE EVALUATION
08/22/2024  Martha Altman is a 69 y.o., female.      Pre-op Assessment    I have reviewed the NPO Status.   I have reviewed the Medications.     Review of Systems  Cardiovascular:  Exercise tolerance: good   Hypertension                                  Hypertension         Hepatic/GI:  Bowel Prep.   GERD      Gerd          Musculoskeletal:  Arthritis        Arthritis          Neurological:      Arthritis                           Endocrine:        Obesity / BMI > 30      Physical Exam  General: Well nourished    Airway:  Mallampati: II   Mouth Opening: Normal  TM Distance: Normal  Tongue: Normal  Neck ROM: Normal ROM    Dental:  Intact    Chest/Lungs:  Clear to auscultation, Normal Respiratory Rate        Anesthesia Plan  Type of Anesthesia, risks & benefits discussed:    Anesthesia Type: Gen Natural Airway  Intra-op Monitoring Plan: Standard ASA Monitors  Induction:  IV  Informed Consent: Informed consent signed with the Patient and all parties understand the risks and agree with anesthesia plan.  All questions answered.   ASA Score: 2    Ready For Surgery From Anesthesia Perspective.     .

## 2024-08-22 NOTE — DISCHARGE INSTRUCTIONS
Recovery After Procedural Sedation (Adult)   You have been given medicine by vein to make you sleep during your surgery. This may have included both a pain medicine and sleeping medicine. Most of the effects have worn off. But you may still have some drowsiness for the next 6 to 8 hours.  Home care  Follow these guidelines when you get home:  For the next 8 hours, you should be watched by a responsible adult. This person should make sure your condition is not getting worse.  Don't drink any alcohol for the next 24 hours.  Don't drive, operate dangerous machinery, or make important business or personal decisions during the next 24 hours.  To prevent injury or falls, use caution when standing and walking for at least 24 hours after your procedure.  Note: Your healthcare provider may tell you not to take any medicine by mouth for pain or sleep in the next 4 hours. These medicines may react with the medicines you were given in the hospital. This could cause a much stronger response than usual.  Follow-up care  Follow up with your healthcare provider if you are not alert and back to your usual level of activity within 12 hours.  When to seek medical advice  Call your healthcare provider right away if any of these occur:  Drowsiness gets worse  Weakness or dizziness gets worse  Repeated vomiting  You can't be awakened  Fever  New rash  [employer] last reviewed this educational content on 9/1/2019  © 1862-7369 [employer]. [address] [place] [address], [place] [address]. All rights reserved. This information is not intended as a substitute for professional medical care. Always follow your healthcare professional's instructions       PROBIOTICS:  Now that your colon is so cleaned out, now is a good time for a round of PROBIOTICS.  Take a Probiotic product such as Align or Culturelle or Tati-Q, every day for a month.                  (The products listed are non-prescription, but you may need to ask the pharmacist for  their location.)  Repeat this at least 5-6 times a year.        High-Fiber Diet  Fiber is in fruits, vegetables, cereals, and grains. Fiber passes through your body undigested. A high-fiber diet helps food move through your intestinal tract. The added bulk is helpful in preventing constipation. In people with diverticulosis, fiber helps clean out the pouches along the colon wall. It also prevents new pouches from forming. A high-fiber diet reduces the risk of colon cancer. It also lowers blood cholesterol and prevents high blood sugar in people with diabetes.    The fiber-rich foods listed below should be part of your diet. If you are not used to high-fiber foods, start with 1 or 2 foods from this list. Every 3 to 4 days add a new one to your diet. Do this until you are eating 4 high-fiber foods per day. This should give you 20 to 35 grams of fiber a day. It is also important to drink a lot of water when you are on this diet. You should have 6 to 8 glasses of water a day. Water makes the fiber swell and increases the benefit.  Foods high in dietary fiber  The following foods are high in dietary fiber:  Breads. Breads made with 100% whole-wheat flour; sue, wheat, or rye crackers; whole-grain tortillas, bran muffins.  Cereals. Whole-grain and bran cereals with bran (shredded wheat, wheat flakes, raisin bran, corn bran); oatmeal, rolled oats, granola, and brown rice.  Fruits. Fresh fruits and their edible skins (pears, prunes, raisins, berries, apples, and apricots); bananas, citrus fruit, mangoes, pineapple; and prune juice.  Nuts. Any nuts and seeds.  Vegetables. Best served raw or lightly cooked. All types, especially: green peas, celery, eggplant, potatoes, spinach, broccoli, [place] sprouts, winter squash, carrots, cauliflower, soybeans, lentils, and fresh and dried beans of all kinds.  Other. Popcorn, any spices.  Date Last Reviewed: 8/1/2016  © 9661-6187 [employer]. [address] [place] [address],  [place] [address]. All rights reserved. This information is not intended as a substitute for professional medical care. Always follow your healthcare professional's instructions.

## 2024-08-22 NOTE — [employer] PATIENT INSTRUCTIONS
Discharge Summary/Instructions for after Colonoscopy without   Biopsy/Polypectomy  Martha Altman    Thursday, August 22, 2024  David Mo MD  RESTRICTIONS ON ACTIVITY:  - Do not drive a car or operate machinery until the day after the procedure.      - The following day: return to full activity including work.  - For  3 days: No heavy lifting, straining or running.  - Diet: You may eat solid foods, but no gassy foods (i.e. beans, broccoli,   cabbage, etc).  TREATMENT FOR COMMON SIDE EFFECTS:  - Mild abdominal pain and bloating or excessive gas: rest, eat lightly and   use a heating pad.  SYMPTOMS TO WATCH FOR AND REPORT TO YOUR PHYSICIAN:  1. Severe abdominal pain.  2. Fever within 24 hours after a procedure.  3. A large amount of rectal bleeding. (A small amount of blood from the   rectum is not serious, especially if hemorrhoids are present.  3.  Because air was put into your colon during the procedure, expelling   large amounts of air from your rectum is normal.  4.  You may not have a bowel movement for 1-3 days because of the   colonoscopy prep.  This is normal.  5.  Call immediately if you notice any of the following:   Chills and/or fever over 101   Persistent vomiting   Severe abdominal pain, other than gas cramps   Severe chest pain   Black, tarry stools   Any bleeding - exceeding one tablespoon  Your doctor recommends these additional instructions:  You are being discharged to home.   Eat a high fiber diet.   Take a fiber supplement, for example Citrucel, Fibercon, Konsyl or   Metamucil. Or Fiber Gummies.  Take a PROBIOTIC, such as ALIGN or CULTURELLE or PADMA-Q (all   non-prescription), every day for a month.   And repeat this at least 5-6   times a year.  Augmented water consumption diet.   Take Miralax 1 capful (17 grams) in 8 ounces of water by mouth once a day as   needed.   Continue your present medications.   Take Bentyl (dicyclomine) 10 mg by mouth four times per day 30 minutes   before  meals.   Your physician has recommended a repeat colonoscopy in five years for   surveillance.   You have a contact number available for emergencies.  The signs and symptoms   of potential delayed complications were discussed with you.  You may return   to normal activities tomorrow.  Written discharge instructions were   provided to you.   You may return to normal activities tomorrow.  None  If you have any questions or problems, please call your physician.  EMERGENCY PHONE NUMBER: [phone]  LAB RESULTS: Call in two (2) weeks for lab results, [phone]  ___________________________________________  Nurse Signature  ___________________________________________  Patient/Designated Responsible Party Signature  David Mo MD  8/22/2024 10:40:34 AM  This report has been verified and signed electronically.  Dear patient,  As a result of recent federal legislation (The Federal Cures Act), you may   receive lab or pathology results from your procedure in your [facility]   account before your physician is able to contact you. Your physician or   their representative will relay the results to you with their   recommendations at their soonest availability.  Thank you.  [employer]

## 2024-08-22 NOTE — ANESTHESIA POSTPROCEDURE EVALUATION
Anesthesia Post Evaluation    Patient: Martha Altman    Procedure(s) Performed: Procedure(s) (LRB):  COLONOSCOPY (N/A)    Final Anesthesia Type: general      Patient location during evaluation: PACU  Patient participation: Yes- Able to Participate  Level of consciousness: awake and alert and oriented  Post-procedure vital signs: reviewed and stable  Pain management: adequate  Airway patency: patent    PONV status at discharge: No PONV  Anesthetic complications: no      Cardiovascular status: blood pressure returned to baseline  Respiratory status: unassisted, spontaneous ventilation and room air  Hydration status: euvolemic  Follow-up not needed.              Vitals Value Taken Time   /66 08/22/24 1045   Temp  08/22/24 1507   Pulse 70 08/22/24 1045   Resp 16 08/22/24 1045   SpO2 98 % 08/22/24 1045         Event Time   Out of Recovery 10:52:00         Pain/Dangelo Score: Dangelo Score: 10 (8/22/2024 10:43 AM)

## 2024-12-05 PROBLEM — E78.5 DYSLIPIDEMIA: Status: ACTIVE | Noted: 2020-02-05

## 2024-12-09 DIAGNOSIS — M25.561 PAIN IN BOTH KNEES, UNSPECIFIED CHRONICITY: Primary | ICD-10-CM

## 2024-12-09 DIAGNOSIS — M25.562 PAIN IN BOTH KNEES, UNSPECIFIED CHRONICITY: Primary | ICD-10-CM

## 2024-12-12 ENCOUNTER — HOSPITAL ENCOUNTER (OUTPATIENT)
Dept: RADIOLOGY | Facility: HOSPITAL | Age: 69
Discharge: HOME | End: 2024-12-12
Attending: ORTHOPAEDIC SURGERY
Payer: MEDICARE

## 2024-12-12 ENCOUNTER — OFFICE VISIT (OUTPATIENT)
Dept: ORTHOPEDICS | Facility: CLINIC | Age: 69
End: 2024-12-12
Payer: MEDICARE

## 2024-12-12 DIAGNOSIS — M25.562 PAIN IN BOTH KNEES, UNSPECIFIED CHRONICITY: ICD-10-CM

## 2024-12-12 DIAGNOSIS — M25.562 CHRONIC PAIN OF BOTH KNEES: ICD-10-CM

## 2024-12-12 DIAGNOSIS — M25.561 CHRONIC PAIN OF BOTH KNEES: ICD-10-CM

## 2024-12-12 DIAGNOSIS — M25.561 PAIN IN BOTH KNEES, UNSPECIFIED CHRONICITY: ICD-10-CM

## 2024-12-12 DIAGNOSIS — M17.0 OSTEOARTHRITIS OF BOTH KNEES, UNSPECIFIED OSTEOARTHRITIS TYPE: Primary | ICD-10-CM

## 2024-12-12 DIAGNOSIS — G89.29 CHRONIC PAIN OF BOTH KNEES: ICD-10-CM

## 2024-12-12 PROCEDURE — 73562 X-RAY EXAM OF KNEE 3: CPT | Mod: 26,50,, | Performed by: RADIOLOGY

## 2024-12-12 PROCEDURE — 99999 PR PBB SHADOW E&M-EST. PATIENT-LVL III: CPT | Mod: PBBFAC,,, | Performed by: ORTHOPAEDIC SURGERY

## 2024-12-12 PROCEDURE — 99204 OFFICE O/P NEW MOD 45 MIN: CPT | Mod: 25,S$GLB,, | Performed by: ORTHOPAEDIC SURGERY

## 2024-12-12 PROCEDURE — 3288F FALL RISK ASSESSMENT DOCD: CPT | Mod: CPTII,S$GLB,, | Performed by: ORTHOPAEDIC SURGERY

## 2024-12-12 PROCEDURE — 73562 X-RAY EXAM OF KNEE 3: CPT | Mod: TC,50,PO

## 2024-12-12 PROCEDURE — 1159F MED LIST DOCD IN RCRD: CPT | Mod: CPTII,S$GLB,, | Performed by: ORTHOPAEDIC SURGERY

## 2024-12-12 PROCEDURE — 20610 DRAIN/INJ JOINT/BURSA W/O US: CPT | Mod: 50,S$GLB,, | Performed by: ORTHOPAEDIC SURGERY

## 2024-12-12 PROCEDURE — 1101F PT FALLS ASSESS-DOCD LE1/YR: CPT | Mod: CPTII,S$GLB,, | Performed by: ORTHOPAEDIC SURGERY

## 2024-12-12 RX ORDER — TRIAMCINOLONE ACETONIDE 40 MG/ML
40 INJECTION, SUSPENSION INTRA-ARTICULAR; INTRAMUSCULAR
Status: DISCONTINUED | OUTPATIENT
Start: 2024-12-12 | End: 2024-12-12 | Stop reason: HOSPADM

## 2024-12-12 RX ADMIN — TRIAMCINOLONE ACETONIDE 40 MG: 40 INJECTION, SUSPENSION INTRA-ARTICULAR; INTRAMUSCULAR at 01:12

## 2024-12-12 NOTE — PROCEDURES
Large Joint Aspiration/Injection: bilateral knee    Date/Time: 12/12/2024 1:00 PM    Performed by: Romulo Lange MD  Authorized by: Romulo Lange MD    Consent Done?:  Yes (Verbal)  Timeout: prior to procedure the correct patient, procedure, and site was verified    Prep: patient was prepped and draped in usual sterile fashion      Details:  Needle Size:  21 G  Approach:  Anterolateral  Location:  Knee  Laterality:  Bilateral  Site:  Bilateral knee  Medications (Right):  40 mg triamcinolone acetonide 40 mg/mL  Medications (Left):  40 mg triamcinolone acetonide 40 mg/mL  Patient tolerance:  Patient tolerated the procedure well with no immediate complications

## 2024-12-12 NOTE — PROGRESS NOTES
69 years old bilateral knee pain getting progressively worse over the past couple years no one time traumatic event pain is 2 on good days 6 on bad days worse with activity pain is been standing still for a long period of time has had injections in the past with good response    Exam shows tenderness the joint line no signs infection no instability skin is intact compartments soft     X-rays show advanced arthritis     Assessment: Bilateral knee arthrosis     Plan: Kenalog injection bilateral knees, we did discuss with the possibility of knee replacement surgery as she would be a good candidate for this

## 2025-04-07 ENCOUNTER — OFFICE VISIT (OUTPATIENT)
Dept: ORTHOPEDICS | Facility: CLINIC | Age: 70
End: 2025-04-07
Payer: MEDICARE

## 2025-04-07 VITALS — BODY MASS INDEX: 28.36 KG/M2 | HEIGHT: 63 IN | WEIGHT: 160.06 LBS

## 2025-04-07 DIAGNOSIS — M17.11 PRIMARY OSTEOARTHRITIS OF RIGHT KNEE: Primary | ICD-10-CM

## 2025-04-07 PROCEDURE — 3288F FALL RISK ASSESSMENT DOCD: CPT | Mod: CPTII,S$GLB,, | Performed by: ORTHOPAEDIC SURGERY

## 2025-04-07 PROCEDURE — 1159F MED LIST DOCD IN RCRD: CPT | Mod: CPTII,S$GLB,, | Performed by: ORTHOPAEDIC SURGERY

## 2025-04-07 PROCEDURE — 1101F PT FALLS ASSESS-DOCD LE1/YR: CPT | Mod: CPTII,S$GLB,, | Performed by: ORTHOPAEDIC SURGERY

## 2025-04-07 PROCEDURE — 1125F AMNT PAIN NOTED PAIN PRSNT: CPT | Mod: CPTII,S$GLB,, | Performed by: ORTHOPAEDIC SURGERY

## 2025-04-07 PROCEDURE — 3008F BODY MASS INDEX DOCD: CPT | Mod: CPTII,S$GLB,, | Performed by: ORTHOPAEDIC SURGERY

## 2025-04-07 PROCEDURE — 99215 OFFICE O/P EST HI 40 MIN: CPT | Mod: 57,S$GLB,, | Performed by: ORTHOPAEDIC SURGERY

## 2025-04-07 PROCEDURE — 99999 PR PBB SHADOW E&M-EST. PATIENT-LVL III: CPT | Mod: PBBFAC,,, | Performed by: ORTHOPAEDIC SURGERY

## 2025-04-07 NOTE — PROGRESS NOTES
69 years old debilitating pain of the right knee.  Patient feels that she is to the point where she is interested in knee replacement surgery.  She has had multiple families that have undergone that she has discuss this major surgery with them.  She has failed a longstanding nonoperative course.  Patient states that last injection was beneficial temporarily    Exam shows tenderness the joint line no signs infection or instability skin is intact compartments soft     X-rays show arthritic changes     Assessment:  Bilateral knee arthrosis     Plan: We will schedule the patient for right total knee arthroplasty, she is aware of the risks and limitations of surgery and still wants to proceed

## 2025-04-15 ENCOUNTER — HOSPITAL ENCOUNTER (OUTPATIENT)
Dept: RADIOLOGY | Facility: HOSPITAL | Age: 70
Discharge: HOME | End: 2025-04-15
Attending: ORTHOPAEDIC SURGERY
Payer: MEDICARE

## 2025-04-15 DIAGNOSIS — M17.11 PRIMARY OSTEOARTHRITIS OF RIGHT KNEE: ICD-10-CM

## 2025-04-15 PROCEDURE — 77073 BONE LENGTH STUDIES: CPT | Mod: 26,,, | Performed by: RADIOLOGY

## 2025-04-15 PROCEDURE — 73721 MRI JNT OF LWR EXTRE W/O DYE: CPT | Mod: 26,RT,, | Performed by: RADIOLOGY

## 2025-04-15 PROCEDURE — 77073 BONE LENGTH STUDIES: CPT | Mod: TC,FY,PO

## 2025-04-15 PROCEDURE — 73721 MRI JNT OF LWR EXTRE W/O DYE: CPT | Mod: TC,PO,RT

## 2025-05-07 ENCOUNTER — HOSPITAL ENCOUNTER (OUTPATIENT)
Dept: RADIOLOGY | Facility: HOSPITAL | Age: 70
Discharge: HOME | End: 2025-05-07
Attending: INTERNAL MEDICINE
Payer: MEDICARE

## 2025-05-07 DIAGNOSIS — Z01.818 PRE-OP EXAMINATION: ICD-10-CM

## 2025-05-07 PROCEDURE — 71046 X-RAY EXAM CHEST 2 VIEWS: CPT | Mod: 26,,, | Performed by: STUDENT IN AN ORGANIZED HEALTH CARE EDUCATION/TRAINING PROGRAM

## 2025-05-07 PROCEDURE — 71046 X-RAY EXAM CHEST 2 VIEWS: CPT | Mod: TC,FY,PO

## 2025-05-13 ENCOUNTER — ANESTHESIA EVENT (OUTPATIENT)
Dept: SURGERY | Facility: HOSPITAL | Age: 70
End: 2025-05-13
Payer: MEDICARE

## 2025-05-13 DIAGNOSIS — G89.29 CHRONIC PAIN OF RIGHT KNEE: ICD-10-CM

## 2025-05-13 DIAGNOSIS — Z96.651 STATUS POST TOTAL RIGHT KNEE REPLACEMENT: ICD-10-CM

## 2025-05-13 DIAGNOSIS — M25.561 CHRONIC PAIN OF RIGHT KNEE: ICD-10-CM

## 2025-05-13 DIAGNOSIS — Z01.812 ENCOUNTER FOR PRE-OPERATIVE LABORATORY TESTING: ICD-10-CM

## 2025-05-13 DIAGNOSIS — M17.11 PRIMARY OSTEOARTHRITIS OF RIGHT KNEE: Primary | ICD-10-CM

## 2025-05-13 RX ORDER — CEFAZOLIN SODIUM 2 G/50ML
2 SOLUTION INTRAVENOUS
OUTPATIENT
Start: 2025-05-13

## 2025-05-13 RX ORDER — MUPIROCIN 20 MG/G
OINTMENT TOPICAL
OUTPATIENT
Start: 2025-05-13

## 2025-05-14 PROBLEM — Z79.01 LONG TERM (CURRENT) USE OF ANTICOAGULANTS: Status: ACTIVE | Noted: 2025-05-14

## 2025-05-19 ENCOUNTER — TELEPHONE (OUTPATIENT)
Dept: ORTHOPEDICS | Facility: CLINIC | Age: 70
End: 2025-05-19
Payer: MEDICARE

## 2025-05-19 NOTE — TELEPHONE ENCOUNTER
----- Message from Med Assistant Irizarry sent at 5/19/2025 10:20 AM CDT -----  Contact: patient  Pt has several questions regarding pre-surgical requirements.Call back number is [phone]

## 2025-05-19 NOTE — TELEPHONE ENCOUNTER
Attempted phone call to pt regarding questions, pt did not answer. Left voicemail to call back with any questions. Ortho backline provided.

## 2025-06-02 ENCOUNTER — TELEPHONE (OUTPATIENT)
Dept: ORTHOPEDICS | Facility: CLINIC | Age: 70
End: 2025-06-02
Payer: MEDICARE

## 2025-06-02 DIAGNOSIS — M17.11 PRIMARY OSTEOARTHRITIS OF RIGHT KNEE: Primary | ICD-10-CM

## 2025-06-02 DIAGNOSIS — Z96.651 STATUS POST TOTAL RIGHT KNEE REPLACEMENT: Primary | ICD-10-CM

## 2025-06-02 RX ORDER — CEPHALEXIN 500 MG/1
500 CAPSULE ORAL 4 TIMES DAILY
Qty: 20 CAPSULE | Refills: 0 | Status: SHIPPED | OUTPATIENT
Start: 2025-06-02

## 2025-06-02 RX ORDER — WARFARIN SODIUM 5 MG/1
5 TABLET ORAL DAILY
Qty: 30 TABLET | Refills: 1 | Status: SHIPPED | OUTPATIENT
Start: 2025-06-02 | End: 2026-06-02

## 2025-06-02 RX ORDER — OXYCODONE AND ACETAMINOPHEN 5; 325 MG/1; MG/1
1 TABLET ORAL EVERY 4 HOURS PRN
Qty: 42 TABLET | Refills: 0 | Status: SHIPPED | OUTPATIENT
Start: 2025-06-02

## 2025-06-03 ENCOUNTER — HOSPITAL ENCOUNTER (OUTPATIENT)
Dept: RADIOLOGY | Facility: HOSPITAL | Age: 70
Discharge: HOME | End: 2025-06-03
Attending: ORTHOPAEDIC SURGERY | Admitting: ORTHOPAEDIC SURGERY
Payer: MEDICARE

## 2025-06-03 ENCOUNTER — NURSE TRIAGE (OUTPATIENT)
Dept: ADMINISTRATIVE | Facility: CLINIC | Age: 70
End: 2025-06-03
Payer: MEDICARE

## 2025-06-03 ENCOUNTER — CLINICAL SUPPORT (OUTPATIENT)
Dept: REHABILITATION | Facility: HOSPITAL | Age: 70
End: 2025-06-03
Attending: ORTHOPAEDIC SURGERY
Payer: MEDICARE

## 2025-06-03 ENCOUNTER — HOSPITAL ENCOUNTER (OUTPATIENT)
Facility: HOSPITAL | Age: 70
Discharge: HOME | End: 2025-06-03
Attending: ORTHOPAEDIC SURGERY | Admitting: ORTHOPAEDIC SURGERY
Payer: MEDICARE

## 2025-06-03 ENCOUNTER — ANESTHESIA (OUTPATIENT)
Dept: SURGERY | Facility: HOSPITAL | Age: 70
End: 2025-06-03
Payer: MEDICARE

## 2025-06-03 VITALS
OXYGEN SATURATION: 95 % | SYSTOLIC BLOOD PRESSURE: 132 MMHG | WEIGHT: 169 LBS | HEART RATE: 58 BPM | RESPIRATION RATE: 12 BRPM | DIASTOLIC BLOOD PRESSURE: 63 MMHG | TEMPERATURE: 97 F | HEIGHT: 63 IN | BODY MASS INDEX: 29.95 KG/M2

## 2025-06-03 DIAGNOSIS — R26.9 GAIT ABNORMALITY: Primary | ICD-10-CM

## 2025-06-03 DIAGNOSIS — G89.29 CHRONIC PAIN OF RIGHT KNEE: ICD-10-CM

## 2025-06-03 DIAGNOSIS — M25.561 CHRONIC PAIN OF RIGHT KNEE: ICD-10-CM

## 2025-06-03 DIAGNOSIS — M17.11 OSTEOARTHRITIS OF RIGHT KNEE: ICD-10-CM

## 2025-06-03 DIAGNOSIS — M17.11 PRIMARY OSTEOARTHRITIS OF RIGHT KNEE: ICD-10-CM

## 2025-06-03 DIAGNOSIS — Z96.651 STATUS POST TOTAL RIGHT KNEE REPLACEMENT: ICD-10-CM

## 2025-06-03 PROCEDURE — C1776 JOINT DEVICE (IMPLANTABLE): HCPCS | Mod: PO | Performed by: ORTHOPAEDIC SURGERY

## 2025-06-03 PROCEDURE — 27447 TOTAL KNEE ARTHROPLASTY: CPT | Mod: RT,,, | Performed by: ORTHOPAEDIC SURGERY

## 2025-06-03 PROCEDURE — 25000003 PHARM REV CODE 250: Mod: PO | Performed by: ORTHOPAEDIC SURGERY

## 2025-06-03 PROCEDURE — 63600175 PHARM REV CODE 636 W HCPCS: Mod: PO | Performed by: ORTHOPAEDIC SURGERY

## 2025-06-03 PROCEDURE — 36000710: Mod: PO | Performed by: ORTHOPAEDIC SURGERY

## 2025-06-03 PROCEDURE — 27200688 HC TRAY, SPINAL-HYPER/ ISOBARIC: Mod: PO | Performed by: NURSE ANESTHETIST, CERTIFIED REGISTERED

## 2025-06-03 PROCEDURE — 37000009 HC ANESTHESIA EA ADD 15 MINS: Mod: PO | Performed by: ORTHOPAEDIC SURGERY

## 2025-06-03 PROCEDURE — 20985 CPTR-ASST DIR MS PX: CPT | Mod: ,,, | Performed by: ORTHOPAEDIC SURGERY

## 2025-06-03 PROCEDURE — 97161 PT EVAL LOW COMPLEX 20 MIN: CPT | Mod: PO

## 2025-06-03 PROCEDURE — 37000008 HC ANESTHESIA 1ST 15 MINUTES: Mod: PO | Performed by: ORTHOPAEDIC SURGERY

## 2025-06-03 PROCEDURE — 71000016 HC POSTOP RECOV ADDL HR: Mod: PO | Performed by: ORTHOPAEDIC SURGERY

## 2025-06-03 PROCEDURE — 71000033 HC RECOVERY, INTIAL HOUR: Mod: PO | Performed by: ORTHOPAEDIC SURGERY

## 2025-06-03 PROCEDURE — 97530 THERAPEUTIC ACTIVITIES: CPT | Mod: PO

## 2025-06-03 PROCEDURE — 63600175 PHARM REV CODE 636 W HCPCS: Mod: PO | Performed by: NURSE ANESTHETIST, CERTIFIED REGISTERED

## 2025-06-03 PROCEDURE — 36000711: Mod: PO | Performed by: ORTHOPAEDIC SURGERY

## 2025-06-03 PROCEDURE — 25000003 PHARM REV CODE 250: Mod: PO | Performed by: ANESTHESIOLOGY

## 2025-06-03 PROCEDURE — 27201423 OPTIME MED/SURG SUP & DEVICES STERILE SUPPLY: Mod: PO | Performed by: ORTHOPAEDIC SURGERY

## 2025-06-03 PROCEDURE — 73560 X-RAY EXAM OF KNEE 1 OR 2: CPT | Mod: 26,RT,, | Performed by: RADIOLOGY

## 2025-06-03 PROCEDURE — 63600175 PHARM REV CODE 636 W HCPCS: Mod: JZ,TB,PO | Performed by: ANESTHESIOLOGY

## 2025-06-03 PROCEDURE — 73560 X-RAY EXAM OF KNEE 1 OR 2: CPT | Mod: TC,FY,PO,RT

## 2025-06-03 PROCEDURE — 71000015 HC POSTOP RECOV 1ST HR: Mod: PO | Performed by: ORTHOPAEDIC SURGERY

## 2025-06-03 PROCEDURE — 63600175 PHARM REV CODE 636 W HCPCS: Mod: PO | Performed by: ANESTHESIOLOGY

## 2025-06-03 DEVICE — IMPLANTABLE DEVICE: Type: IMPLANTABLE DEVICE | Site: KNEE | Status: FUNCTIONAL

## 2025-06-03 RX ORDER — FENTANYL CITRATE 50 UG/ML
25 INJECTION, SOLUTION INTRAMUSCULAR; INTRAVENOUS EVERY 5 MIN PRN
Status: DISCONTINUED | OUTPATIENT
Start: 2025-06-03 | End: 2025-06-03 | Stop reason: HOSPADM

## 2025-06-03 RX ORDER — LIDOCAINE HYDROCHLORIDE 20 MG/ML
INJECTION INTRAVENOUS
Status: DISCONTINUED | OUTPATIENT
Start: 2025-06-03 | End: 2025-06-04

## 2025-06-03 RX ORDER — OXYCODONE HYDROCHLORIDE 5 MG/1
5 TABLET ORAL
Status: DISCONTINUED | OUTPATIENT
Start: 2025-06-03 | End: 2025-06-03 | Stop reason: HOSPADM

## 2025-06-03 RX ORDER — CEFAZOLIN SODIUM 1 G/3ML
1 INJECTION, POWDER, FOR SOLUTION INTRAMUSCULAR; INTRAVENOUS ONCE
Status: COMPLETED | OUTPATIENT
Start: 2025-06-03 | End: 2025-06-03

## 2025-06-03 RX ORDER — BUPIVACAINE HYDROCHLORIDE 5 MG/ML
INJECTION, SOLUTION EPIDURAL; INTRACAUDAL; PERINEURAL
Status: COMPLETED | OUTPATIENT
Start: 2025-06-03 | End: 2025-06-03

## 2025-06-03 RX ORDER — MIDAZOLAM HYDROCHLORIDE 1 MG/ML
INJECTION INTRAMUSCULAR; INTRAVENOUS
Status: DISCONTINUED | OUTPATIENT
Start: 2025-06-03 | End: 2025-06-04

## 2025-06-03 RX ORDER — MIDAZOLAM HYDROCHLORIDE 1 MG/ML
0.5 INJECTION, SOLUTION INTRAMUSCULAR; INTRAVENOUS
Status: DISCONTINUED | OUTPATIENT
Start: 2025-06-03 | End: 2025-06-03 | Stop reason: HOSPADM

## 2025-06-03 RX ORDER — ACETAMINOPHEN 500 MG
1000 TABLET ORAL
Status: COMPLETED | OUTPATIENT
Start: 2025-06-03 | End: 2025-06-03

## 2025-06-03 RX ORDER — CEFAZOLIN SODIUM 1 G/3ML
2 INJECTION, POWDER, FOR SOLUTION INTRAMUSCULAR; INTRAVENOUS
Status: COMPLETED | OUTPATIENT
Start: 2025-06-03 | End: 2025-06-03

## 2025-06-03 RX ORDER — OXYCODONE HCL 10 MG/1
10 TABLET, FILM COATED, EXTENDED RELEASE ORAL ONCE
Status: COMPLETED | OUTPATIENT
Start: 2025-06-03 | End: 2025-06-03

## 2025-06-03 RX ORDER — PROCHLORPERAZINE EDISYLATE 5 MG/ML
5 INJECTION INTRAMUSCULAR; INTRAVENOUS EVERY 4 HOURS PRN
Status: DISCONTINUED | OUTPATIENT
Start: 2025-06-03 | End: 2025-06-03 | Stop reason: HOSPADM

## 2025-06-03 RX ORDER — BUPIVACAINE 13.3 MG/ML
INJECTION, SUSPENSION, LIPOSOMAL INFILTRATION
Status: COMPLETED | OUTPATIENT
Start: 2025-06-03 | End: 2025-06-03

## 2025-06-03 RX ORDER — SODIUM CHLORIDE, SODIUM LACTATE, POTASSIUM CHLORIDE, CALCIUM CHLORIDE 600; 310; 30; 20 MG/100ML; MG/100ML; MG/100ML; MG/100ML
INJECTION, SOLUTION INTRAVENOUS CONTINUOUS
Status: DISCONTINUED | OUTPATIENT
Start: 2025-06-03 | End: 2025-06-03 | Stop reason: HOSPADM

## 2025-06-03 RX ORDER — PHENYLEPHRINE HYDROCHLORIDE 10 MG/ML
INJECTION INTRAVENOUS
Status: DISCONTINUED | OUTPATIENT
Start: 2025-06-03 | End: 2025-06-04

## 2025-06-03 RX ORDER — SODIUM CHLORIDE 0.9 % (FLUSH) 0.9 %
10 SYRINGE (ML) INJECTION ONCE
Status: DISCONTINUED | OUTPATIENT
Start: 2025-06-03 | End: 2025-06-03 | Stop reason: HOSPADM

## 2025-06-03 RX ORDER — LIDOCAINE HYDROCHLORIDE 10 MG/ML
1 INJECTION, SOLUTION EPIDURAL; INFILTRATION; INTRACAUDAL; PERINEURAL ONCE
Status: DISCONTINUED | OUTPATIENT
Start: 2025-06-03 | End: 2025-06-03 | Stop reason: HOSPADM

## 2025-06-03 RX ORDER — HYDROMORPHONE HYDROCHLORIDE 2 MG/ML
0.2 INJECTION, SOLUTION INTRAMUSCULAR; INTRAVENOUS; SUBCUTANEOUS EVERY 5 MIN PRN
Status: DISCONTINUED | OUTPATIENT
Start: 2025-06-03 | End: 2025-06-03 | Stop reason: HOSPADM

## 2025-06-03 RX ORDER — SODIUM CHLORIDE 9 MG/ML
INJECTION, SOLUTION INTRAVENOUS CONTINUOUS
Status: DISCONTINUED | OUTPATIENT
Start: 2025-06-03 | End: 2025-06-03 | Stop reason: HOSPADM

## 2025-06-03 RX ORDER — DIPHENHYDRAMINE HYDROCHLORIDE 50 MG/ML
25 INJECTION, SOLUTION INTRAMUSCULAR; INTRAVENOUS EVERY 6 HOURS PRN
Status: DISCONTINUED | OUTPATIENT
Start: 2025-06-03 | End: 2025-06-03 | Stop reason: HOSPADM

## 2025-06-03 RX ORDER — MUPIROCIN 20 MG/G
OINTMENT TOPICAL
Status: DISCONTINUED | OUTPATIENT
Start: 2025-06-03 | End: 2025-06-03 | Stop reason: HOSPADM

## 2025-06-03 RX ORDER — FENTANYL CITRATE 50 UG/ML
INJECTION, SOLUTION INTRAMUSCULAR; INTRAVENOUS
Status: DISCONTINUED | OUTPATIENT
Start: 2025-06-03 | End: 2025-06-04

## 2025-06-03 RX ORDER — CELECOXIB 200 MG/1
400 CAPSULE ORAL ONCE
Status: COMPLETED | OUTPATIENT
Start: 2025-06-03 | End: 2025-06-03

## 2025-06-03 RX ORDER — PROPOFOL 10 MG/ML
VIAL (ML) INTRAVENOUS CONTINUOUS PRN
Status: DISCONTINUED | OUTPATIENT
Start: 2025-06-03 | End: 2025-06-04

## 2025-06-03 RX ORDER — GLUCAGON 1 MG
1 KIT INJECTION
Status: DISCONTINUED | OUTPATIENT
Start: 2025-06-03 | End: 2025-06-03 | Stop reason: HOSPADM

## 2025-06-03 RX ORDER — ONDANSETRON HYDROCHLORIDE 2 MG/ML
INJECTION, SOLUTION INTRAVENOUS
Status: DISCONTINUED | OUTPATIENT
Start: 2025-06-03 | End: 2025-06-04

## 2025-06-03 RX ADMIN — CEFAZOLIN 2 G: 330 INJECTION, POWDER, FOR SOLUTION INTRAMUSCULAR; INTRAVENOUS at 09:06

## 2025-06-03 RX ADMIN — PHENYLEPHRINE HYDROCHLORIDE 100 MCG: 10 INJECTION INTRAVENOUS at 10:06

## 2025-06-03 RX ADMIN — TRANEXAMIC ACID 1000 MG: 100 INJECTION, SOLUTION INTRAVENOUS at 09:06

## 2025-06-03 RX ADMIN — OXYCODONE HYDROCHLORIDE 10 MG: 10 TABLET, FILM COATED, EXTENDED RELEASE ORAL at 08:06

## 2025-06-03 RX ADMIN — BUPIVACAINE HYDROCHLORIDE 10 ML: 5 INJECTION, SOLUTION EPIDURAL; INTRACAUDAL; PERINEURAL at 08:06

## 2025-06-03 RX ADMIN — PROPOFOL 100 MCG/KG/MIN: 10 INJECTION, EMULSION INTRAVENOUS at 09:06

## 2025-06-03 RX ADMIN — FENTANYL CITRATE 100 MCG: 50 INJECTION INTRAMUSCULAR; INTRAVENOUS at 08:06

## 2025-06-03 RX ADMIN — MUPIROCIN: 20 OINTMENT TOPICAL at 08:06

## 2025-06-03 RX ADMIN — OXYCODONE 5 MG: 5 TABLET ORAL at 12:06

## 2025-06-03 RX ADMIN — FENTANYL CITRATE 50 MCG: 50 INJECTION, SOLUTION INTRAMUSCULAR; INTRAVENOUS at 09:06

## 2025-06-03 RX ADMIN — CEFAZOLIN 1 G: 330 INJECTION, POWDER, FOR SOLUTION INTRAMUSCULAR; INTRAVENOUS at 11:06

## 2025-06-03 RX ADMIN — LIDOCAINE HYDROCHLORIDE 50 MG: 20 INJECTION INTRAVENOUS at 09:06

## 2025-06-03 RX ADMIN — ONDANSETRON 8 MG: 2 INJECTION, SOLUTION INTRAMUSCULAR; INTRAVENOUS at 09:06

## 2025-06-03 RX ADMIN — CELECOXIB 400 MG: 200 CAPSULE ORAL at 08:06

## 2025-06-03 RX ADMIN — MIDAZOLAM 2 MG: 1 INJECTION INTRAMUSCULAR; INTRAVENOUS at 08:06

## 2025-06-03 RX ADMIN — MIDAZOLAM HYDROCHLORIDE 1 MG: 1 INJECTION, SOLUTION INTRAMUSCULAR; INTRAVENOUS at 09:06

## 2025-06-03 RX ADMIN — SODIUM CHLORIDE, POTASSIUM CHLORIDE, SODIUM LACTATE AND CALCIUM CHLORIDE: 600; 310; 30; 20 INJECTION, SOLUTION INTRAVENOUS at 09:06

## 2025-06-03 RX ADMIN — BUPIVACAINE 20 ML: 13.3 INJECTION, SUSPENSION, LIPOSOMAL INFILTRATION at 08:06

## 2025-06-03 RX ADMIN — SODIUM CHLORIDE, POTASSIUM CHLORIDE, SODIUM LACTATE AND CALCIUM CHLORIDE: 600; 310; 30; 20 INJECTION, SOLUTION INTRAVENOUS at 08:06

## 2025-06-03 RX ADMIN — ACETAMINOPHEN 1000 MG: 500 TABLET ORAL at 08:06

## 2025-06-03 RX ADMIN — MEPIVACAINE HYDROCHLORIDE 2.8 ML: 20 INJECTION, SOLUTION EPIDURAL; INFILTRATION at 09:06

## 2025-06-04 ENCOUNTER — ANESTHESIA (OUTPATIENT)
Dept: SURGERY | Facility: HOSPITAL | Age: 70
End: 2025-06-04
Payer: MEDICARE

## 2025-06-04 ENCOUNTER — TELEPHONE (OUTPATIENT)
Dept: ORTHOPEDICS | Facility: CLINIC | Age: 70
End: 2025-06-04

## 2025-06-04 ENCOUNTER — HOSPITAL ENCOUNTER (OUTPATIENT)
Facility: HOSPITAL | Age: 70
Discharge: HOME | End: 2025-06-04
Attending: ORTHOPAEDIC SURGERY | Admitting: ORTHOPAEDIC SURGERY
Payer: MEDICARE

## 2025-06-04 ENCOUNTER — CLINICAL SUPPORT (OUTPATIENT)
Dept: REHABILITATION | Facility: HOSPITAL | Age: 70
End: 2025-06-04
Attending: ORTHOPAEDIC SURGERY
Payer: MEDICARE

## 2025-06-04 ENCOUNTER — ANESTHESIA EVENT (OUTPATIENT)
Dept: SURGERY | Facility: HOSPITAL | Age: 70
End: 2025-06-04
Payer: MEDICARE

## 2025-06-04 ENCOUNTER — HOSPITAL ENCOUNTER (OUTPATIENT)
Dept: RADIOLOGY | Facility: HOSPITAL | Age: 70
Discharge: HOME | End: 2025-06-04
Attending: ORTHOPAEDIC SURGERY
Payer: MEDICARE

## 2025-06-04 ENCOUNTER — OFFICE VISIT (OUTPATIENT)
Dept: ORTHOPEDICS | Facility: CLINIC | Age: 70
End: 2025-06-04
Payer: MEDICARE

## 2025-06-04 VITALS
DIASTOLIC BLOOD PRESSURE: 65 MMHG | SYSTOLIC BLOOD PRESSURE: 153 MMHG | TEMPERATURE: 97 F | HEART RATE: 83 BPM | RESPIRATION RATE: 16 BRPM | OXYGEN SATURATION: 95 %

## 2025-06-04 DIAGNOSIS — Z96.651 STATUS POST TOTAL RIGHT KNEE REPLACEMENT: ICD-10-CM

## 2025-06-04 DIAGNOSIS — T81.33XA: ICD-10-CM

## 2025-06-04 DIAGNOSIS — T81.33XA TRAUMATIC WOUND DEHISCENCE, INITIAL ENCOUNTER: Primary | ICD-10-CM

## 2025-06-04 DIAGNOSIS — R26.9 ABNORMAL GAIT: Primary | ICD-10-CM

## 2025-06-04 DIAGNOSIS — Z96.651 STATUS POST TOTAL RIGHT KNEE REPLACEMENT: Primary | ICD-10-CM

## 2025-06-04 PROCEDURE — 73562 X-RAY EXAM OF KNEE 3: CPT | Mod: TC,PO,RT

## 2025-06-04 PROCEDURE — 97161 PT EVAL LOW COMPLEX 20 MIN: CPT | Mod: PO

## 2025-06-04 PROCEDURE — 71000015 HC POSTOP RECOV 1ST HR: Mod: PO | Performed by: ORTHOPAEDIC SURGERY

## 2025-06-04 PROCEDURE — 73562 X-RAY EXAM OF KNEE 3: CPT | Mod: 26,RT,, | Performed by: RADIOLOGY

## 2025-06-04 PROCEDURE — 36000707: Mod: PO | Performed by: ORTHOPAEDIC SURGERY

## 2025-06-04 PROCEDURE — 97530 THERAPEUTIC ACTIVITIES: CPT | Mod: PO

## 2025-06-04 PROCEDURE — 37000008 HC ANESTHESIA 1ST 15 MINUTES: Mod: PO | Performed by: ORTHOPAEDIC SURGERY

## 2025-06-04 PROCEDURE — 27385 REPAIR OF THIGH MUSCLE: CPT | Mod: 78,RT,, | Performed by: ORTHOPAEDIC SURGERY

## 2025-06-04 PROCEDURE — 63600175 PHARM REV CODE 636 W HCPCS: Mod: PO | Performed by: ORTHOPAEDIC SURGERY

## 2025-06-04 PROCEDURE — 71000033 HC RECOVERY, INTIAL HOUR: Mod: PO | Performed by: ORTHOPAEDIC SURGERY

## 2025-06-04 PROCEDURE — 25000003 PHARM REV CODE 250: Mod: PO | Performed by: ORTHOPAEDIC SURGERY

## 2025-06-04 PROCEDURE — 63600175 PHARM REV CODE 636 W HCPCS: Mod: PO | Performed by: NURSE ANESTHETIST, CERTIFIED REGISTERED

## 2025-06-04 PROCEDURE — 99999 PR PBB SHADOW E&M-EST. PATIENT-LVL II: CPT | Mod: PBBFAC,,, | Performed by: ORTHOPAEDIC SURGERY

## 2025-06-04 PROCEDURE — 37000009 HC ANESTHESIA EA ADD 15 MINS: Mod: PO | Performed by: ORTHOPAEDIC SURGERY

## 2025-06-04 PROCEDURE — 25000003 PHARM REV CODE 250: Mod: PO | Performed by: NURSE ANESTHETIST, CERTIFIED REGISTERED

## 2025-06-04 PROCEDURE — 73560 X-RAY EXAM OF KNEE 1 OR 2: CPT | Mod: 26,59,LT, | Performed by: RADIOLOGY

## 2025-06-04 PROCEDURE — 36000706: Mod: PO | Performed by: ORTHOPAEDIC SURGERY

## 2025-06-04 PROCEDURE — 27201423 OPTIME MED/SURG SUP & DEVICES STERILE SUPPLY: Mod: PO | Performed by: ORTHOPAEDIC SURGERY

## 2025-06-04 PROCEDURE — 25000003 PHARM REV CODE 250: Mod: PO | Performed by: ANESTHESIOLOGY

## 2025-06-04 PROCEDURE — 63600175 PHARM REV CODE 636 W HCPCS: Mod: PO | Performed by: ANESTHESIOLOGY

## 2025-06-04 RX ORDER — FENTANYL CITRATE 50 UG/ML
INJECTION, SOLUTION INTRAMUSCULAR; INTRAVENOUS
Status: DISCONTINUED | OUTPATIENT
Start: 2025-06-04 | End: 2025-06-04

## 2025-06-04 RX ORDER — PROPOFOL 10 MG/ML
VIAL (ML) INTRAVENOUS
Status: DISCONTINUED | OUTPATIENT
Start: 2025-06-04 | End: 2025-06-04

## 2025-06-04 RX ORDER — MIDAZOLAM HYDROCHLORIDE 1 MG/ML
INJECTION INTRAMUSCULAR; INTRAVENOUS
Status: DISCONTINUED | OUTPATIENT
Start: 2025-06-04 | End: 2025-06-04

## 2025-06-04 RX ORDER — LIDOCAINE HYDROCHLORIDE 20 MG/ML
INJECTION, SOLUTION EPIDURAL; INFILTRATION; INTRACAUDAL; PERINEURAL
Status: DISCONTINUED | OUTPATIENT
Start: 2025-06-04 | End: 2025-06-04

## 2025-06-04 RX ORDER — METOCLOPRAMIDE HYDROCHLORIDE 5 MG/ML
10 INJECTION INTRAMUSCULAR; INTRAVENOUS ONCE
Status: COMPLETED | OUTPATIENT
Start: 2025-06-04 | End: 2025-06-04

## 2025-06-04 RX ORDER — MUPIROCIN 20 MG/G
OINTMENT TOPICAL
Status: CANCELLED | OUTPATIENT
Start: 2025-06-04

## 2025-06-04 RX ORDER — MUPIROCIN 20 MG/G
OINTMENT TOPICAL
Status: DISCONTINUED | OUTPATIENT
Start: 2025-06-04 | End: 2025-06-04 | Stop reason: HOSPADM

## 2025-06-04 RX ORDER — METOCLOPRAMIDE HYDROCHLORIDE 5 MG/ML
10 INJECTION INTRAMUSCULAR; INTRAVENOUS EVERY 10 MIN PRN
Status: DISCONTINUED | OUTPATIENT
Start: 2025-06-04 | End: 2025-06-04 | Stop reason: HOSPADM

## 2025-06-04 RX ORDER — FAMOTIDINE 10 MG/ML
INJECTION, SOLUTION INTRAVENOUS
Status: DISCONTINUED | OUTPATIENT
Start: 2025-06-04 | End: 2025-06-04

## 2025-06-04 RX ORDER — CEFAZOLIN SODIUM 1 G/3ML
1 INJECTION, POWDER, FOR SOLUTION INTRAMUSCULAR; INTRAVENOUS ONCE
Status: COMPLETED | OUTPATIENT
Start: 2025-06-04 | End: 2025-06-04

## 2025-06-04 RX ORDER — SODIUM CHLORIDE 0.9 % (FLUSH) 0.9 %
3 SYRINGE (ML) INJECTION
Status: DISCONTINUED | OUTPATIENT
Start: 2025-06-04 | End: 2025-06-04 | Stop reason: HOSPADM

## 2025-06-04 RX ORDER — FENTANYL CITRATE 50 UG/ML
25 INJECTION, SOLUTION INTRAMUSCULAR; INTRAVENOUS EVERY 5 MIN PRN
Status: DISCONTINUED | OUTPATIENT
Start: 2025-06-04 | End: 2025-06-04 | Stop reason: HOSPADM

## 2025-06-04 RX ORDER — ONDANSETRON HYDROCHLORIDE 2 MG/ML
INJECTION, SOLUTION INTRAVENOUS
Status: DISCONTINUED | OUTPATIENT
Start: 2025-06-04 | End: 2025-06-04

## 2025-06-04 RX ORDER — PHENYLEPHRINE HCL IN 0.9% NACL 1 MG/10 ML
SYRINGE (ML) INTRAVENOUS
Status: DISCONTINUED | OUTPATIENT
Start: 2025-06-04 | End: 2025-06-04

## 2025-06-04 RX ORDER — SUCCINYLCHOLINE CHLORIDE 20 MG/ML
INJECTION INTRAMUSCULAR; INTRAVENOUS
Status: DISCONTINUED | OUTPATIENT
Start: 2025-06-04 | End: 2025-06-04

## 2025-06-04 RX ORDER — DEXAMETHASONE SODIUM PHOSPHATE 4 MG/ML
INJECTION, SOLUTION INTRA-ARTICULAR; INTRALESIONAL; INTRAMUSCULAR; INTRAVENOUS; SOFT TISSUE
Status: DISCONTINUED | OUTPATIENT
Start: 2025-06-04 | End: 2025-06-04

## 2025-06-04 RX ORDER — OXYCODONE HYDROCHLORIDE 5 MG/1
5 TABLET ORAL ONCE AS NEEDED
Status: COMPLETED | OUTPATIENT
Start: 2025-06-04 | End: 2025-06-04

## 2025-06-04 RX ORDER — ACETAMINOPHEN 10 MG/ML
INJECTION, SOLUTION INTRAVENOUS
Status: DISCONTINUED | OUTPATIENT
Start: 2025-06-04 | End: 2025-06-04

## 2025-06-04 RX ORDER — CEFAZOLIN SODIUM 2 G/50ML
2 SOLUTION INTRAVENOUS
Status: CANCELLED | OUTPATIENT
Start: 2025-06-04

## 2025-06-04 RX ORDER — CEFAZOLIN SODIUM 1 G/3ML
2 INJECTION, POWDER, FOR SOLUTION INTRAMUSCULAR; INTRAVENOUS
Status: COMPLETED | OUTPATIENT
Start: 2025-06-04 | End: 2025-06-04

## 2025-06-04 RX ORDER — ROCURONIUM BROMIDE 10 MG/ML
INJECTION, SOLUTION INTRAVENOUS
Status: DISCONTINUED | OUTPATIENT
Start: 2025-06-04 | End: 2025-06-04

## 2025-06-04 RX ORDER — SODIUM CHLORIDE, SODIUM LACTATE, POTASSIUM CHLORIDE, CALCIUM CHLORIDE 600; 310; 30; 20 MG/100ML; MG/100ML; MG/100ML; MG/100ML
INJECTION, SOLUTION INTRAVENOUS CONTINUOUS
Status: DISCONTINUED | OUTPATIENT
Start: 2025-06-04 | End: 2025-06-04 | Stop reason: HOSPADM

## 2025-06-04 RX ORDER — FAMOTIDINE 10 MG/ML
20 INJECTION, SOLUTION INTRAVENOUS ONCE
Status: COMPLETED | OUTPATIENT
Start: 2025-06-04 | End: 2025-06-04

## 2025-06-04 RX ADMIN — SODIUM CHLORIDE, POTASSIUM CHLORIDE, SODIUM LACTATE AND CALCIUM CHLORIDE: 600; 310; 30; 20 INJECTION, SOLUTION INTRAVENOUS at 11:06

## 2025-06-04 RX ADMIN — FAMOTIDINE 20 MG: 10 INJECTION, SOLUTION INTRAVENOUS at 01:06

## 2025-06-04 RX ADMIN — MUPIROCIN: 20 OINTMENT TOPICAL at 11:06

## 2025-06-04 RX ADMIN — OXYCODONE 5 MG: 5 TABLET ORAL at 02:06

## 2025-06-04 RX ADMIN — FENTANYL CITRATE 100 MCG: 50 INJECTION, SOLUTION INTRAMUSCULAR; INTRAVENOUS at 12:06

## 2025-06-04 RX ADMIN — CEFAZOLIN 2 G: 330 INJECTION, POWDER, FOR SOLUTION INTRAMUSCULAR; INTRAVENOUS at 01:06

## 2025-06-04 RX ADMIN — Medication 100 MCG: at 01:06

## 2025-06-04 RX ADMIN — ROCURONIUM BROMIDE 20 MG: 10 INJECTION, SOLUTION INTRAVENOUS at 01:06

## 2025-06-04 RX ADMIN — LIDOCAINE HYDROCHLORIDE 50 MG: 20 INJECTION, SOLUTION EPIDURAL; INFILTRATION; INTRACAUDAL; PERINEURAL at 12:06

## 2025-06-04 RX ADMIN — PROPOFOL 140 MG: 10 INJECTION, EMULSION INTRAVENOUS at 12:06

## 2025-06-04 RX ADMIN — SUGAMMADEX 200 MG: 100 INJECTION, SOLUTION INTRAVENOUS at 01:06

## 2025-06-04 RX ADMIN — ROCURONIUM BROMIDE 10 MG: 10 INJECTION, SOLUTION INTRAVENOUS at 12:06

## 2025-06-04 RX ADMIN — ONDANSETRON 4 MG: 2 INJECTION, SOLUTION INTRAMUSCULAR; INTRAVENOUS at 01:06

## 2025-06-04 RX ADMIN — DEXAMETHASONE SODIUM PHOSPHATE 4 MG: 4 INJECTION, SOLUTION INTRAMUSCULAR; INTRAVENOUS at 01:06

## 2025-06-04 RX ADMIN — CEFAZOLIN 1 G: 330 INJECTION, POWDER, FOR SOLUTION INTRAMUSCULAR; INTRAVENOUS at 02:06

## 2025-06-04 RX ADMIN — ACETAMINOPHEN 1000 MG: 10 INJECTION INTRAVENOUS at 01:06

## 2025-06-04 RX ADMIN — SODIUM CHLORIDE, SODIUM LACTATE, POTASSIUM CHLORIDE, AND CALCIUM CHLORIDE: .6; .31; .03; .02 INJECTION, SOLUTION INTRAVENOUS at 12:06

## 2025-06-04 RX ADMIN — MIDAZOLAM HYDROCHLORIDE 2 MG: 1 INJECTION, SOLUTION INTRAMUSCULAR; INTRAVENOUS at 12:06

## 2025-06-04 RX ADMIN — METOCLOPRAMIDE 10 MG: 5 INJECTION, SOLUTION INTRAMUSCULAR; INTRAVENOUS at 12:06

## 2025-06-04 RX ADMIN — SUCCINYLCHOLINE CHLORIDE 120 MG: 20 INJECTION, SOLUTION INTRAMUSCULAR; INTRAVENOUS at 12:06

## 2025-06-04 NOTE — DISCHARGE SUMMARY
Discharge Note  Short Stay      SUMMARY     Admit Date: 6/4/2025    Attending Physician: Romulo Lange MD     Discharge Physician: Romulo Lange MD    Discharge Date: 6/4/2025 2:10 PM    Final Diagnosis: Status post total right knee replacement [Z96.651]    Hospital Course: Patient tolerated procedure well.     Disposition: Home or Self Care    Patient Instructions:   Current Discharge Medication List        CONTINUE these medications which have NOT CHANGED    Details   aspirin (ECOTRIN) 81 MG EC tablet Take 81 mg by mouth once daily.      calcium carbonate (OS-DELIA) 600 mg calcium (1,500 mg) Tab Take 600 mg by mouth once.      cephALEXin (KEFLEX) 500 MG capsule Take 1 capsule (500 mg total) by mouth 4 (four) times daily.  Qty: 20 capsule, Refills: 0      dicyclomine (BENTYL) 10 MG capsule Take 1 capsule (10 mg total) by mouth 4 (four) times daily before meals and nightly. , to ease colon spasms and gas pockets.  Qty: 120 capsule, Refills: 11      diltiaZEM (TIAZAC) 240 MG Cs24 Take 1 capsule (240 mg total) by mouth once daily.  Qty: 90 capsule, Refills: 1    Associated Diagnoses: Moderate hypertension      estradioL (ESTRACE) 0.01 % (0.1 mg/gram) vaginal cream SMARTSIG:Sparingly Vaginal Every Night      famotidine (PEPCID ORAL) Take by mouth.      multivit with calcium,iron,min (WOMEN'S MULTIPLE VITAMINS ORAL) Take by mouth once daily.      olmesartan-hydrochlorothiazide (BENICAR HCT) 20-12.5 mg per tablet Take 1 tablet by mouth once daily.  Qty: 90 tablet, Refills: 1    Comments: .  Associated Diagnoses: Moderate hypertension      oxyCODONE-acetaminophen (PERCOCET) 5-325 mg per tablet Take 1 tablet by mouth every 4 (four) hours as needed for Pain.  Qty: 42 tablet, Refills: 0    Comments: Quantity prescribed more than 7 day supply? Yes, quantity medically necessary  Associated Diagnoses: Primary osteoarthritis of right knee      peppermint oil (IBGARD ORAL) Take by mouth.      psyllium husk (METAMUCIL  ORAL) Take by mouth.      rosuvastatin (CRESTOR) 20 MG tablet Take 1 tablet (20 mg total) by mouth once daily.  Qty: 90 tablet, Refills: 1    Associated Diagnoses: Dyslipidemia      vit A/vit C/vit E/zinc/copper (PRESERVISION AREDS ORAL) Take by mouth once daily.      vitamin D (VITAMIN D3) 1000 units Tab Take 1,000 Units by mouth once daily.      warfarin (COUMADIN) 5 MG tablet Take 1 tablet (5 mg total) by mouth Daily.  Qty: 30 tablet, Refills: 1    Comments: Take first dose the night before surgery between 5-6pm. Then daily as directed by the [facility] Coumadin clinic.      Bifidobacterium infantis (ALIGN ORAL) Take by mouth.      LORazepam (ATIVAN) 0.5 MG tablet Take 1 tablet (0.5 mg total) by mouth every 12 (twelve) hours as needed (anxiety with travel).  Qty: 10 tablet, Refills: 0    Associated Diagnoses: Anxiety with flying             Discharge Procedure Orders (must include Diet, Follow-up, Activity):   Discharge Procedure Orders (must include Diet, Follow-up, Activity)   Keep surgical extremity elevated     Ice to affected area      Remove dressing in 72 hours     Activity as tolerated     Weight bearing restrictions (specify):   Order Comments: Use walker, protected mobility        Follow Up:  Follow up as scheduled.  Resume routine diet.  Activity as tolerated.    No driving day of procedure.

## 2025-06-04 NOTE — INTERVAL H&P NOTE
The patient has been examined and the H&P has been reviewed:    I concur with the findings and no changes have occurred since H&P was written.    Surgery risks, benefits and alternative options discussed and understood by patient/family.      Imaging studies ordered and reviewed by me    Further History  Aching pain  Worse with activity  Relieved with rest  No other associated symptoms  No other radiation    Further Exam  Alert and oriented  Pleasant  Contralateral limb has appropriate range of motion for age and condition  Contralateral limb has appropriate strength for age and condition  Contralateral limb has appropriate stability  for age and condition  No adenopathy  Pulses are appropriate for current condition  Skin is intact        Chief Complaint    No chief complaint on file.      HPI  Martha Altman is a 70 y.o.  female who presents with       Past Medical History  Past Medical History:   Diagnosis Date    Anxiety     Breast cyst     Essential (primary) hypertension     Gastro-esophageal reflux disease without esophagitis        Past Surgical History  Past Surgical History:   Procedure Laterality Date    COLONOSCOPY  09/26/2019        COLONOSCOPY N/A 8/22/2024    Procedure: COLONOSCOPY;  Surgeon: David Mo Jr., MD;  Location: [facility];  Service: Endoscopy;  Laterality: N/A;    HYSTERECTOMY  1990    OOPHORECTOMY Bilateral 1990       Medications  Current Facility-Administered Medications   Medication    ceFAZolin injection 2 g    [COMPLETED] famotidine (PF) injection 20 mg    lactated ringers infusion    mupirocin 2 % ointment       Allergies  Review of patient's allergies indicates:  No Known Allergies    Family History  Family History   Problem Relation Name Age of Onset    Hypertension Mother      Hyperlipidemia Mother      Heart disease Father      Hypertension Father      Hypertension Brother      Hyperlipidemia Brother      Hypertension Brother      Hyperlipidemia Brother       Hypertension Brother      Hyperlipidemia Brother      Inflammatory bowel disease Cousin      Breast cancer Cousin      Colon cancer Neg Hx      Esophageal cancer Neg Hx      Stomach cancer Neg Hx         Social History  Social History[1]            Review of Systems     Constitutional: Negative    HENT: Negative  Eyes: Negative  Respiratory: Negative  Cardiovascular: Negative  Musculoskeletal: HPI  Skin: Negative  Neurological: Negative  Hematological: Negative  Endocrine: Negative                 Physical Exam    Vitals:    06/04/25 1141   BP: 139/63   Pulse: 75   Resp: 17   Temp: 97.6 °F (36.4 °C)     There is no height or weight on file to calculate BMI.  Physical Examination:     General appearance -  well appearing, and in no distress  Mental status - awake  Neck - supple  Chest -  symmetric air entry  Heart - normal rate   Abdomen - soft      Assessment     1. Status post total right knee replacement    2. Traumatic wound dehiscence          Plan    There are no hospital problems to display for this patient.         [1]   Social History  Socioeconomic History    Marital status: Single   Tobacco Use    Smoking status: Never    Smokeless tobacco: Never   Substance and Sexual Activity    Alcohol use: Yes     Alcohol/week: 2.0 standard drinks of alcohol     Types: 2 Glasses of wine per week    Drug use: Never    Sexual activity: Not Currently     Social Drivers of Health     Financial Resource Strain: Low Risk  (4/3/2025)    Overall Financial Resource Strain (CARDIA)     Difficulty of Paying Living Expenses: Not hard at all   Food Insecurity: No Food Insecurity (4/3/2025)    Hunger Vital Sign     Worried About Running Out of Food in the Last Year: Never true     Ran Out of Food in the Last Year: Never true   Transportation Needs: No Transportation Needs (4/3/2025)    PRAPARE - Transportation     Lack of Transportation (Medical): No     Lack of Transportation (Non-Medical): No   Physical Activity: Insufficiently  Active (4/3/2025)    Exercise Vital Sign     Days of Exercise per Week: 3 days     Minutes of Exercise per Session: 20 min   Stress: Stress Concern Present (4/3/2025)    [employer] - Occupational Stress Questionnaire     Feeling of Stress : To some extent   Housing Stability: Low Risk  (4/3/2025)    Housing Stability Vital Sign     Unable to Pay for Housing in the Last Year: No     Number of Times Moved in the Last Year: 0     Homeless in the Last Year: No

## 2025-06-04 NOTE — PROGRESS NOTES
Orriginal post op dressing removed by Nazario RAMÍREZ. Replaced with fresh xeroform, gauze, abd, cast padding and ace.

## 2025-06-04 NOTE — OP NOTE
[facility] - Surgery  Operative Note    SUMMARY     Date of Procedure: 6/4/2025     Pre-Operative Diagnosis: Status post total right knee replacement [Z96.651] traumatic wound dehiscence    Post-Operative Diagnosis: Post-Op Diagnosis Codes:     * Status post total right knee replacement [Z96.651]  Traumatic wound dehiscence, disruption of the extensor mechanism    Procedure: Procedure(s) (LRB):  IRRIGATION AND DEBRIDEMENT, RIGHT TKA (Right)  CLOSURE, WOUND, LOWER EXTREMITY (Right)     Extensor mechanism and retinacular repair    Surgeons and Role:     * Romulo Lange MD - Primary    Indications for procedure:      Procedure in Detail:  After obtaining consent and starting the patient on preoperative IV antibiotics, patient taken back to the operating room, general anesthesia performed by the anesthesia team.  We pre cleaned lower extremity with alcohol, we removed the staples that were in place and some of the 2-0 Vicryl sutures that were clearly visible in the open wound which the inferior aspect of the longitudinal incision.  We then performed a normal prepped and draped with the right lower extremity.  After 10 minutes of elevation of the pneumatic tourniquet was inflated to 300 mm of mercury.  We had Pulsavac irrigated the superficial soft tissues we then probed the extensor mechanism and found at the superior portion of the extensor retinaculum repair there was a defect at the quadriceps tendon near the superior pole of the patella that extended to the lateral retinaculum.  We thoroughly irrigated into the joint with Pulsavac irrigation as well as citrate based antimicrobial wash.  We then closed the quad defect in lateral retinacular disruption with 1 Vicryl figure-of-eight sutures.  Further Pulsavac with saline solution the superficial wound and then closed the subcutaneous tissue with 2-0 Vicryl interrupted stitches.  The skin was reapproximated with 3-0 nylon horizontal mattress stitches.  We then  applied a sterile dressing with compression wrap and then deflated the pneumatic tourniquet.  This concluded the operative procedure    Anesthesia: Choice    Complications: No    Estimated Blood Loss (EBL): * No values recorded between 6/4/2025 12:42 PM and 6/4/2025  2:02 PM *           Implants: * No implants in log *    Specimens:   Specimen (24h ago, onward)      None

## 2025-06-04 NOTE — H&P (VIEW-ONLY)
70 years old underwent right total knee arthroplasty yesterday without complication.  Last night in the how she had a fall had some bleeding in the wound comes in today in the clinic to be checked.      We Took the bandage down which was saturated.  And found that the inferior half of the wound was opened appear to be a superficial opening no active bleeding of the time.  Wound was clean    Assessment:  Traumatic wound dehiscence after total knee arthroplasty 1 day out     Plan:  We will schedule the patient for washout wound exploration and closure.  Patient and family are aware of the risks and limitations of surgery and still want to proceed

## 2025-06-10 ENCOUNTER — PATIENT MESSAGE (OUTPATIENT)
Dept: ORTHOPEDICS | Facility: CLINIC | Age: 70
End: 2025-06-10
Payer: MEDICARE

## 2025-06-10 DIAGNOSIS — M17.11 PRIMARY OSTEOARTHRITIS OF RIGHT KNEE: ICD-10-CM

## 2025-06-10 RX ORDER — OXYCODONE AND ACETAMINOPHEN 5; 325 MG/1; MG/1
1 TABLET ORAL
Qty: 42 TABLET | Refills: 0 | Status: SHIPPED | OUTPATIENT
Start: 2025-06-10 | End: 2025-06-11

## 2025-06-11 DIAGNOSIS — Z96.651 STATUS POST TOTAL RIGHT KNEE REPLACEMENT: Primary | ICD-10-CM

## 2025-06-11 RX ORDER — OXYCODONE AND ACETAMINOPHEN 5; 325 MG/1; MG/1
1 TABLET ORAL
Qty: 42 TABLET | Refills: 0 | Status: SHIPPED | OUTPATIENT
Start: 2025-06-11

## 2025-06-16 ENCOUNTER — PATIENT MESSAGE (OUTPATIENT)
Dept: ORTHOPEDICS | Facility: CLINIC | Age: 70
End: 2025-06-16
Payer: MEDICARE

## 2025-06-19 ENCOUNTER — OFFICE VISIT (OUTPATIENT)
Dept: ORTHOPEDICS | Facility: CLINIC | Age: 70
End: 2025-06-19
Payer: MEDICARE

## 2025-06-19 DIAGNOSIS — Z96.651 STATUS POST TOTAL RIGHT KNEE REPLACEMENT: Primary | ICD-10-CM

## 2025-06-19 PROCEDURE — 99024 POSTOP FOLLOW-UP VISIT: CPT | Mod: S$GLB,,, | Performed by: ORTHOPAEDIC SURGERY

## 2025-06-19 NOTE — PROGRESS NOTES
Two weeks out from washout and closure dehisced surgical wound from fall after knee replacement surgery     Exam shows incision healing nicely, no signs infection, sutures removed, extensor mechanism is intact    Plan:  Continue with therapy, follow-up in a month's time as a postop visit with x-rays of her right knee

## 2025-06-23 ENCOUNTER — PATIENT MESSAGE (OUTPATIENT)
Dept: ORTHOPEDICS | Facility: CLINIC | Age: 70
End: 2025-06-23
Payer: MEDICARE

## 2025-06-23 ENCOUNTER — CLINICAL SUPPORT (OUTPATIENT)
Dept: REHABILITATION | Facility: HOSPITAL | Age: 70
End: 2025-06-23
Attending: ORTHOPAEDIC SURGERY
Payer: MEDICARE

## 2025-06-23 DIAGNOSIS — Z96.651 STATUS POST TOTAL RIGHT KNEE REPLACEMENT: Primary | ICD-10-CM

## 2025-06-23 DIAGNOSIS — Z96.651 STATUS POST TOTAL RIGHT KNEE REPLACEMENT: ICD-10-CM

## 2025-06-23 DIAGNOSIS — M25.561 CHRONIC PAIN OF RIGHT KNEE: Primary | ICD-10-CM

## 2025-06-23 DIAGNOSIS — T81.33XA TRAUMATIC WOUND DEHISCENCE, INITIAL ENCOUNTER: ICD-10-CM

## 2025-06-23 DIAGNOSIS — G89.29 CHRONIC PAIN OF RIGHT KNEE: Primary | ICD-10-CM

## 2025-06-23 DIAGNOSIS — R26.9 ABNORMAL GAIT: ICD-10-CM

## 2025-06-23 DIAGNOSIS — M25.661 DECREASED RANGE OF MOTION (ROM) OF RIGHT KNEE: ICD-10-CM

## 2025-06-23 PROCEDURE — 97161 PT EVAL LOW COMPLEX 20 MIN: CPT | Mod: PO

## 2025-06-23 PROCEDURE — 97530 THERAPEUTIC ACTIVITIES: CPT | Mod: PO

## 2025-06-23 NOTE — PROGRESS NOTES
Outpatient Rehab    Physical Therapy Evaluation    Patient Name: Martha Altman  MRN: [id number]  YOB: 1955  Encounter Date: 6/23/2025    Therapy Diagnosis:   Encounter Diagnoses   Name Primary?    Status post total right knee replacement     Traumatic wound dehiscence, initial encounter     Chronic pain of right knee Yes    Abnormal gait     Decreased range of motion (ROM) of right knee      Physician: Romulo Lange MD    Physician Orders: Eval and Treat  Medical Diagnosis: Status post total right knee replacement  Traumatic wound dehiscence, initial encounter  Surgical Diagnosis: Not applicable for this Episode   Surgical Date: 6/3/2025  Days Since Last Surgery: 20    Visit # / Visits Authorized:  1 / 1  Insurance Authorization Period: 6/23/2025 to 6/23/2026  Date of Evaluation: 6/23/2025  Plan of Care Certification: 6/23/2025 to 9/15/2025     Time In: 1458   Time Out: 1549  Total Time (in minutes): 51   Total Billable Time (in minutes): 51    Intake Outcome Measure for FOTO Survey    Therapist reviewed FOTO scores for Martha Altman on 6/23/2025.   FOTO report - see Media section or FOTO account episode details.     Intake Score: 54%    Precautions:       Subjective   History of Present Illness  Martha is a 70 y.o. female      The patient reports a medical diagnosis of Diagnosis  Z96.651 (ICD-10-CM) - Status post total right knee replacement  T81.33XA (ICD-10-CM) - Traumatic wound dehiscence, initial encounter.   Surgery occurred on 06/03/25. Diagnostic tests related to this condition: X-ray.   X-Ray Details: There is prominent medial joint space narrowing on the left.  There is tricompartmental osteophyte formation on the left.  The skin staples and bandaging material are seen on the right.  Right ule total knee prosthesis appears well aligned and positioned.  Regional soft tissue swelling is noted on the right.    History of Present Condition/Illness: Patient reports that she had a  TKA on 6/3/2025 with a clean out and revision done on 6/4/2025 following a fall and opening of her incision. She states that she currently ambulates with a SPC within the community and at home sometimes. States that her knee feels unsteady like it will want to give out on her at times. States that she did not ambulate with a SPC prior to surgery. States that she had a surgery because of stiffness and pain in her right knee that went on for years before becoming worse over the past few months prior to surgery. States that she feels fine performing transfers as long as she can use her UE for assistance. States that she had HHPT for 2 weeks and was able to achieve 91 degrees of knee flexion. States that pain has been intermittent since the surgery.    Pain     Patient reports a current pain level of 2/10. Pain at best is reported as 1/10. Pain at worst is reported as 6/10.   Location: R knee  Clinical Progression (since onset): Stable  Pain Qualities: Aching, Dull, Knife-like, Sharp  Pain-Relieving Factors: Medications - over-the-counter, Medications - prescription  Pain-Aggravating Factors: Exercise, Standing, Walking         Review of Systems  Patient reports: Cardiac History, Decreased Range of Motion, Functional Changes, Joint Pain, Joint Swelling, and Stiffness  Patient denies: Cancer History and Diabetes  Additional Red Flag Details: HTN, heart murmur     Treatment History  Treatments  Discharged From Past 30 Days: Home health care  Previously Received Treatments: Yes  Previous Treatments: Physical therapy    Living Arrangements  Home Setup  Home Access: Stairs without rails  Entrance Stairs - Number of Steps: 3  Number of Levels in Home: One level        Employment  Employment Status: Retired          Past Medical History/Physical Systems Review:   Martha Altman  has a past medical history of Anxiety, Breast cyst, Essential (primary) hypertension, and Gastro-esophageal reflux disease without  esophagitis.    Martha Altman  has a past surgical history that includes Colonoscopy (09/26/2019); Oophorectomy (Bilateral, 1990); Hysterectomy (1990); Colonoscopy (N/A, 8/22/2024); irrigation and debridement (Right, 6/4/2025); closure, wound, lower extremity, left (Right, 6/4/2025); and Total knee arthroplasty (Right, 6/3/2025).    Martha has a current medication list which includes the following prescription(s): aspirin, bifidobacterium infantis, calcium carbonate, cephalexin, dicyclomine, diltiazem, estradiol, famotidine, lorazepam, multivit with calcium,iron,min, olmesartan-hydrochlorothiazide, oxycodone-acetaminophen, peppermint oil, psyllium husk, rosuvastatin, vit a/vit c/vit e/zinc/copper, vitamin d, and warfarin.    Review of patient's allergies indicates:  No Known Allergies     Objective   Knee Observations  Right Knee Observations  Present: Edema, Effusion, Incision, and Straight Leg Raise Extensor Lag  Right Knee Incision Observations  Present: Clean and Dry  Not Present: Drainage             Knee Swelling  Location of Measurement Right  (cm) Left  (cm)   20 cm Above Joint Line       10 cm Vastus Medialis Oblique 53 47   At Joint Line 47 43.5   15 cm Below Joint Line 40.8 39.5             Knee Range of Motion   Right Knee   Active (deg) Passive (deg) Pain   Flexion 92 96 Yes   Extension -2 1 Yes       Left Knee   Active (deg) Passive (deg) Pain   Flexion 120 123     Extension 2 2                        Hip Strength - Planes of Motion   Right Strength Right Pain Left Strength Left  Pain   Flexion (L2) 3+ Yes 4+     Extension 4   4+     ABduction 4+   4+     ADduction           Internal Rotation           External Rotation               Knee Strength   Right Strength Right Pain Left Strength Left  Pain   Flexion (S2)     5     Prone Flexion           Extension (L3)     5       Knee Extensor Lag  Lag Present: Right  22 degree extensor lag on right with SLR        Bed Mobility Assessment  Rolling  Assistance: Independent  Sidelying to Sit Assistance: Independent  Sit to Sidelying Assistance: Independent  Scooting to Edge of Bed Assistance: Independent      Timed Up & Go (TUG)  Time: 12 seconds  Observations: Slow tentative pace and Short strides  An older adult who takes >=12 seconds to complete the TUG is at risk for falling.    With SPC  Sit to Stand Testing      The patient completed 7 repetitions of a sit to stand transfer in 30 seconds. No use of UE for assistance          Gait Analysis  Base of Support: Normal  Gait Pattern: Antalgic  Walking Speed: Decreased    Right Side Walking Observations  Increased: Stance Time       Left Side Walking Observations  Increased: Stance Time  Decreased: Step Length     Gait Analysis Details  Patient ambulates with SPC during today's visit         Treatment:  Therapeutic Exercise  TE 1: NV: Recumbent bike, HEP, quad sets with e-stim, heel slides, mini squats, marches, sidelying hip abduction  Therapeutic Activity  TA 1: Patient was educated on exam findings, expectations for her right knee, and exercises to continue from HHPT  TA 2: Pt was given an HEP consisting of: SLR, bridges, propped knee extension stretch, and LAQs    Time Entry(in minutes):  PT Evaluation (Low) Time Entry: 35  Therapeutic Activity Time Entry: 16    Assessment & Plan   Assessment  Martha presents with a condition of Low complexity.   Presentation of Symptoms: Stable  Will Comorbidities Impact Care: No       Functional Limitations: Activity tolerance, Ambulating on uneven surfaces, Carrying objects, Community integration, Completing self-care activities, Decreased ambulation distance/endurance, Driving, Fine motor coordination, Functional mobility, Gait limitations, Getting off the floor, Gross motor coordination, Increased risk of fall, Maintaining balance, Painful locomotion/ambulation, Performing household chores, Proprioception, Range of motion, Squatting, Transfers  Impairments: Activity  intolerance, Abnormal gait, Abnormal muscle firing, Abnormal or restricted range of motion, Impaired balance, Impaired physical strength, Lack of appropriate home exercise program, Pain with functional activity  Personal Factors Affecting Prognosis: Transportation, Pain    Patient Goal for Therapy (PT): Return to PLOF without R knee pain  Prognosis: Good  Assessment Details: Patient presents today to outpatient physical therapy services for initial evaluation of their right knee pain s/p right TKA on 6/3/2025. They present today with chief complaint of right knee pain and sensation of instability. She is currently nearly 3 weeks s/p right TKA and revision following wound dehiscence 2/2 fall on surgical knee. Her incision is dry, healing well, and shows no s/s of infection. She presents today with increased right knee edema, loss of right knee flexion and extension active and passive range of motion, reduced gait capacity, inability to perform a SLR without extensor lag, and general LE weakness. She currently ambulates with a SPC for balance purposes. She would benefit from physical therapy services to facilitate improvements in her right LE strength and range of motion, as well as reduce her right knee pain.    Plan  From a physical therapy perspective, the patient would benefit from: Skilled Rehab Services    Planned therapy interventions include: Therapeutic exercise, Therapeutic activities, Neuromuscular re-education, Manual therapy, and Gait training.    Planned modalities to include: Cryotherapy (cold pack), Electrical stimulation - passive/unattended, and Thermotherapy (hot pack).        Visit Frequency: 3 times Per Week for 12 Weeks.  Other/tapered frequency details: Taper to 2x/wk after first 2 weeks    This plan was discussed with Patient.   Discussion participants: Agreed Upon Plan of Care             The patient's spiritual, cultural, and educational needs were considered, and the patient is agreeable to  the plan of care and goals.     Education  Education was done with Patient. The patient's learning style includes Listening. The patient Verbalizes understanding.         Patient was educated on exam findings, expectations for her right knee, and exercises to continue from HHPT       Goals:   Active       A) STGs       HEP       Start:  06/23/25    Expected End:  07/21/25       Pt will be independent and compliant with her HEP to impact their progress towards their goals           Pain       Start:  06/23/25    Expected End:  07/21/25       Patient will report their resting, average right knee pain as </= 0/10 to impact their QoL.          range of motion       Start:  06/23/25    Expected End:  07/21/25       Patient will improve her right knee flexion PROM to >/= 115 degrees to impact her bed mobility         Strength        Start:  06/23/25    Expected End:  07/21/25       Patient will improve their right knee manual muscle test score to 4/5 to demonstrate improvements in functional strength          Gait       Start:  06/23/25    Expected End:  07/21/25       Patient will be able to ambulate community and household distances without need for SPC to demonstrate improvements in her confidence in her balance            B) LTGs       FOTO       Start:  06/23/25    Expected End:  09/15/25       Patient will improve their FOTO score to >/= 78 to demonstrate clinically significant improvements in function and ADL performance          Pain       Start:  06/23/25    Expected End:  09/15/25       Patient will report worst pain in her right knee as </= 1/10 to impact her tolerance to performing physical activity         range of motion       Start:  06/23/25    Expected End:  09/15/25       Patient will improve her right knee flexion AROM to >/= 120 degrees to impact her ability to ascend/descend stairs         Strength       Start:  06/23/25    Expected End:  09/15/25       Patient will improve their R knee manual muscle  test score to 5/5 to demonstrate improvements in functional strength          Transfers       Start:  06/23/25    Expected End:  09/15/25       Patient will improve her 30 second sit to stand test to >/= 9 repetitions to demonstrate improvements in functional LE strength              Jake Watson, PT

## 2025-06-25 ENCOUNTER — CLINICAL SUPPORT (OUTPATIENT)
Dept: REHABILITATION | Facility: HOSPITAL | Age: 70
End: 2025-06-25
Attending: ORTHOPAEDIC SURGERY
Payer: MEDICARE

## 2025-06-25 DIAGNOSIS — T81.33XA TRAUMATIC WOUND DEHISCENCE, INITIAL ENCOUNTER: ICD-10-CM

## 2025-06-25 DIAGNOSIS — Z96.651 STATUS POST TOTAL RIGHT KNEE REPLACEMENT: ICD-10-CM

## 2025-06-25 DIAGNOSIS — G89.29 CHRONIC PAIN OF RIGHT KNEE: Primary | ICD-10-CM

## 2025-06-25 DIAGNOSIS — M25.661 DECREASED RANGE OF MOTION (ROM) OF RIGHT KNEE: ICD-10-CM

## 2025-06-25 DIAGNOSIS — M25.561 CHRONIC PAIN OF RIGHT KNEE: Primary | ICD-10-CM

## 2025-06-25 DIAGNOSIS — R26.9 ABNORMAL GAIT: ICD-10-CM

## 2025-06-25 PROCEDURE — 97112 NEUROMUSCULAR REEDUCATION: CPT | Mod: PO,CQ

## 2025-06-25 PROCEDURE — 97530 THERAPEUTIC ACTIVITIES: CPT | Mod: PO,CQ

## 2025-06-25 PROCEDURE — 97110 THERAPEUTIC EXERCISES: CPT | Mod: PO,CQ

## 2025-06-25 NOTE — PROGRESS NOTES
"  Outpatient Rehab    Physical Therapy Visit    Patient Name: Martha Altman  MRN: [id number]  YOB: 1955  Encounter Date: 6/25/2025    Therapy Diagnosis:   Encounter Diagnoses   Name Primary?    Status post total right knee replacement      Traumatic wound dehiscence, initial encounter      Chronic pain of right knee Yes    Abnormal gait      Decreased range of motion (ROM) of right knee      Physician: Romulo Lange MD    Physician Orders: Eval and Treat  Medical Diagnosis: Status post total right knee replacement  Traumatic wound dehiscence, initial encounter  Surgical Diagnosis: Not applicable for this Episode   Surgical Date: 6/3/2025  Days Since Last Surgery: 22    Visit # / Visits Authorized:  1 / 20  Insurance Authorization Period: 6/23/2025 to 9/15/2025  Date of Evaluation: 6/23/2025  Plan of Care Certification: 6/23/2025 to 9/15/2025      PT/PTA: PTA   Number of PTA visits since last PT visit:1  Time In: 1100   Time Out: 1153  Total Time (in minutes): 53   Total Billable Time (in minutes): 53    FOTO:  Intake Score: 54%  Survey Score 2:  %  Survey Score 3:  %    Precautions:       Subjective   Pt reports min R knee pain levels upon arrival for today's visit with aid of pain medication. She did note she went 12 hours yesterday without having to take pain meds.  Pain reported as 2/10.      Objective            Treatment:  Therapeutic Exercise  TE 1: Nu Step x 8 min  TE 2: Supine heel slids x 20 5" holds  TE 3: Knee ext heel prop x 3 minutes  Balance/Neuromuscular Re-Education  NMR 1: NMES 50PPS 10 sec on/10sec off w/quad sets x 8 minutes  NMR 2: Bridges 3 x 10  NMR 3: SLR 2 x 8 w/therapist assist  NMR 4: SL hip abduction 2 x 10  NMR 5: LAQ 2 x 10  Therapeutic Activity  TA 1: Standing heel raises x 30  TA 2: Standing marches/hip abd 2 x 10 ea    Time Entry(in minutes):  Neuromuscular Re-Education Time Entry: 25  Therapeutic Activity Time Entry: 10  Therapeutic Exercise Time Entry: " 18    Assessment & Plan   Assessment: Martha returned for her first follow up visit reporting min R knee pain levels with the aid of pain medication. She does notes she has been santi to begin weaning off of pain meds. Treatment began with HEP review with additional knee mobility and functional quad/hip strengthening exercises. NMES was also performed to improve quad activation. During SLR pt demonstrated extensor lag and required min assist from therapist. Will monitor and progress per pt's tolerance.       The patient will continue to benefit from skilled outpatient physical therapy in order to address the deficits listed in the problem list on the initial evaluation, provide patient and family education, and maximize the patients level of independence in the home and community environments.     The patient's spiritual, cultural, and educational needs were considered, and the patient is agreeable to the plan of care and goals.           Plan: Continue with POC    Goals:   Active       A) STGs       HEP       Start:  06/23/25    Expected End:  07/21/25       Pt will be independent and compliant with her HEP to impact their progress towards their goals           Pain       Start:  06/23/25    Expected End:  07/21/25       Patient will report their resting, average right knee pain as </= 0/10 to impact their QoL.          range of motion       Start:  06/23/25    Expected End:  07/21/25       Patient will improve her right knee flexion PROM to >/= 115 degrees to impact her bed mobility         Strength        Start:  06/23/25    Expected End:  07/21/25       Patient will improve their right knee manual muscle test score to 4/5 to demonstrate improvements in functional strength          Gait       Start:  06/23/25    Expected End:  07/21/25       Patient will be able to ambulate community and household distances without need for SPC to demonstrate improvements in her confidence in her balance            B) LTGs        FOTO       Start:  06/23/25    Expected End:  09/15/25       Patient will improve their FOTO score to >/= 78 to demonstrate clinically significant improvements in function and ADL performance          Pain       Start:  06/23/25    Expected End:  09/15/25       Patient will report worst pain in her right knee as </= 1/10 to impact her tolerance to performing physical activity         range of motion       Start:  06/23/25    Expected End:  09/15/25       Patient will improve her right knee flexion AROM to >/= 120 degrees to impact her ability to ascend/descend stairs         Strength       Start:  06/23/25    Expected End:  09/15/25       Patient will improve their R knee manual muscle test score to 5/5 to demonstrate improvements in functional strength          Transfers       Start:  06/23/25    Expected End:  09/15/25       Patient will improve her 30 second sit to stand test to >/= 9 repetitions to demonstrate improvements in functional LE strength              Rico Gagnon, PTA

## 2025-06-26 ENCOUNTER — PATIENT MESSAGE (OUTPATIENT)
Dept: ORTHOPEDICS | Facility: CLINIC | Age: 70
End: 2025-06-26
Payer: MEDICARE

## 2025-06-26 DIAGNOSIS — Z96.651 STATUS POST TOTAL RIGHT KNEE REPLACEMENT: ICD-10-CM

## 2025-06-26 RX ORDER — OXYCODONE AND ACETAMINOPHEN 5; 325 MG/1; MG/1
1 TABLET ORAL
Qty: 42 TABLET | Refills: 0 | Status: SHIPPED | OUTPATIENT
Start: 2025-06-26

## 2025-06-27 ENCOUNTER — CLINICAL SUPPORT (OUTPATIENT)
Dept: REHABILITATION | Facility: HOSPITAL | Age: 70
End: 2025-06-27
Attending: ORTHOPAEDIC SURGERY
Payer: MEDICARE

## 2025-06-27 DIAGNOSIS — M25.561 CHRONIC PAIN OF RIGHT KNEE: ICD-10-CM

## 2025-06-27 DIAGNOSIS — T81.33XA TRAUMATIC WOUND DEHISCENCE, INITIAL ENCOUNTER: ICD-10-CM

## 2025-06-27 DIAGNOSIS — R26.9 ABNORMAL GAIT: ICD-10-CM

## 2025-06-27 DIAGNOSIS — G89.29 CHRONIC PAIN OF RIGHT KNEE: ICD-10-CM

## 2025-06-27 DIAGNOSIS — Z96.651 STATUS POST TOTAL RIGHT KNEE REPLACEMENT: Primary | ICD-10-CM

## 2025-06-27 DIAGNOSIS — M25.661 DECREASED RANGE OF MOTION (ROM) OF RIGHT KNEE: ICD-10-CM

## 2025-06-27 PROCEDURE — 97530 THERAPEUTIC ACTIVITIES: CPT | Mod: PO,CQ

## 2025-06-27 PROCEDURE — 97112 NEUROMUSCULAR REEDUCATION: CPT | Mod: PO,CQ

## 2025-06-27 PROCEDURE — 97110 THERAPEUTIC EXERCISES: CPT | Mod: PO,CQ

## 2025-06-27 NOTE — PROGRESS NOTES
"  Outpatient Rehab    Physical Therapy Visit    Patient Name: Martha Altman  MRN: [id number]  YOB: 1955  Encounter Date: 6/27/2025    Therapy Diagnosis:   Encounter Diagnoses   Name Primary?    Status post total right knee replacement      Traumatic wound dehiscence, initial encounter      Chronic pain of right knee Yes    Abnormal gait      Decreased range of motion (ROM) of right knee      Physician: Romulo Lange MD    Physician Orders: Eval and Treat  Medical Diagnosis: Status post total right knee replacement  Traumatic wound dehiscence, initial encounter  Surgical Diagnosis: Not applicable for this Episode   Surgical Date: 6/3/2025  Days Since Last Surgery: 24    Visit # / Visits Authorized:  2 / 20  Insurance Authorization Period: 6/23/2025 to 9/15/2025  Date of Evaluation: 6/23/2025  Plan of Care Certification: 6/23/2025 to 9/15/2025      PT/PTA: PTA   Number of PTA visits since last PT visit:2  Time In: 1100   Time Out: 1153  Total Time (in minutes): 53   Total Billable Time (in minutes): 53    FOTO:  Intake Score: 54%  Survey Score 2:  %  Survey Score 3:  %    Precautions:       Subjective   Pt reprots slight increase in R knee pain and muscular soreness following first follow up visit.  Pain reported as 3/10.      Objective      Knee Range of Motion 6/27/25  Right Knee    Active (deg) Passive (deg) Pain   Flexion 98 102 Yes   Extension -1 0 No         Treatment:  Therapeutic Exercise  TE 1: Nu Step x 8 min  TE 2: Supine heel slides x 20 5" holds  TE 3: Knee ext heel prop x 3 minutes  TE 4: Gastroc stretch on incline board 3 x 30"  Balance/Neuromuscular Re-Education  NMR 1: Quad sets towel under knee x 30 5" holds  NMR 2: Bridges 3 x 10  NMR 3: SLR 2 x 10 w/therapist assist  NMR 4: SL hip abduction 2 x 10  NMR 5: LAQ 2 x 10  Therapeutic Activity  TA 1: Standing heel raises x 30  TA 2: Standing marches/hip abd 2 x 10 ea  TA 3: Sit to stands 2 x 10    Time Entry(in " minutes):  Neuromuscular Re-Education Time Entry: 25  Therapeutic Activity Time Entry: 10  Therapeutic Exercise Time Entry: 18    Assessment & Plan   Assessment: Martha returned reporting slight increase in R knee pain levels and muscular soreness following first follow up visit. She demonstrated improved R knee ROM measuring at 1-98 actively and 0-102 passively. Pt also demonstrated good quad activation where NMES was no longer needed today. Extensor lag still noted during SLR. No increase in pain noted during today's visit. Will continue to progress per pt's tolerance.       The patient will continue to benefit from skilled outpatient physical therapy in order to address the deficits listed in the problem list on the initial evaluation, provide patient and family education, and maximize the patients level of independence in the home and community environments.     The patient's spiritual, cultural, and educational needs were considered, and the patient is agreeable to the plan of care and goals.           Plan: Continue with POC    Goals:   Active       A) STGs       HEP       Start:  06/23/25    Expected End:  07/21/25       Pt will be independent and compliant with her HEP to impact their progress towards their goals           Pain       Start:  06/23/25    Expected End:  07/21/25       Patient will report their resting, average right knee pain as </= 0/10 to impact their QoL.          range of motion       Start:  06/23/25    Expected End:  07/21/25       Patient will improve her right knee flexion PROM to >/= 115 degrees to impact her bed mobility         Strength        Start:  06/23/25    Expected End:  07/21/25       Patient will improve their right knee manual muscle test score to 4/5 to demonstrate improvements in functional strength          Gait       Start:  06/23/25    Expected End:  07/21/25       Patient will be able to ambulate community and household distances without need for SPC to demonstrate  improvements in her confidence in her balance            B) LTGs       FOTO       Start:  06/23/25    Expected End:  09/15/25       Patient will improve their FOTO score to >/= 78 to demonstrate clinically significant improvements in function and ADL performance          Pain       Start:  06/23/25    Expected End:  09/15/25       Patient will report worst pain in her right knee as </= 1/10 to impact her tolerance to performing physical activity         range of motion       Start:  06/23/25    Expected End:  09/15/25       Patient will improve her right knee flexion AROM to >/= 120 degrees to impact her ability to ascend/descend stairs         Strength       Start:  06/23/25    Expected End:  09/15/25       Patient will improve their R knee manual muscle test score to 5/5 to demonstrate improvements in functional strength          Transfers       Start:  06/23/25    Expected End:  09/15/25       Patient will improve her 30 second sit to stand test to >/= 9 repetitions to demonstrate improvements in functional LE strength                Rico Gagnon, PTA

## 2025-06-30 ENCOUNTER — CLINICAL SUPPORT (OUTPATIENT)
Dept: REHABILITATION | Facility: HOSPITAL | Age: 70
End: 2025-06-30
Attending: ORTHOPAEDIC SURGERY
Payer: MEDICARE

## 2025-06-30 DIAGNOSIS — M25.561 CHRONIC PAIN OF RIGHT KNEE: Primary | ICD-10-CM

## 2025-06-30 DIAGNOSIS — T81.33XA TRAUMATIC WOUND DEHISCENCE, INITIAL ENCOUNTER: ICD-10-CM

## 2025-06-30 DIAGNOSIS — M25.661 DECREASED RANGE OF MOTION (ROM) OF RIGHT KNEE: ICD-10-CM

## 2025-06-30 DIAGNOSIS — R26.9 ABNORMAL GAIT: ICD-10-CM

## 2025-06-30 DIAGNOSIS — G89.29 CHRONIC PAIN OF RIGHT KNEE: Primary | ICD-10-CM

## 2025-06-30 DIAGNOSIS — Z96.651 STATUS POST TOTAL RIGHT KNEE REPLACEMENT: ICD-10-CM

## 2025-06-30 PROCEDURE — 97530 THERAPEUTIC ACTIVITIES: CPT | Mod: PO,CQ

## 2025-06-30 PROCEDURE — 97110 THERAPEUTIC EXERCISES: CPT | Mod: PO,CQ

## 2025-06-30 PROCEDURE — 97112 NEUROMUSCULAR REEDUCATION: CPT | Mod: PO,CQ

## 2025-06-30 NOTE — PROGRESS NOTES
"  Outpatient Rehab    Physical Therapy Visit    Patient Name: Martha Altman  MRN: [id number]  YOB: 1955  Encounter Date: 6/30/2025    Therapy Diagnosis:   Encounter Diagnoses   Name Primary?    Status post total right knee replacement      Traumatic wound dehiscence, initial encounter      Chronic pain of right knee Yes    Abnormal gait      Decreased range of motion (ROM) of right knee      Physician: Romulo Lange MD    Physician Orders: Eval and Treat  Medical Diagnosis: Status post total right knee replacement  Traumatic wound dehiscence, initial encounter  Surgical Diagnosis: Not applicable for this Episode   Surgical Date: 6/3/2025  Days Since Last Surgery: 27    Visit # / Visits Authorized:  3 / 20  Insurance Authorization Period: 6/23/2025 to 9/15/2025  Date of Evaluation: 6/23/2025  Plan of Care Certification: 6/23/2025 to 9/15/2025      PT/PTA: PTA   Number of PTA visits since last PT visit:3  Time In: 1100   Time Out: 1153  Total Time (in minutes): 53   Total Billable Time (in minutes): 53    FOTO:  Intake Score: 54%  Survey Score 2:  %  Survey Score 3:  %    Precautions:       Subjective   Pt reports continued mild R knee pain. She notes most of her pain occurs at night while attempting to sleep, stating "I just can't get comfortable some nights".  Pain reported as 3/10.      Objective      Knee Range of Motion 6/27/25  Right Knee    Active (deg) Passive (deg) Pain   Flexion 98 102 Yes   Extension -1 0 No         Treatment:  Therapeutic Exercise  TE 1: Nu Step x 8 min  TE 2: Supine heel slides x 20 5" holds  TE 3: Knee ext heel prop x 3 minutes  TE 4: Gastroc stretch on incline board 3 x 30"  TE 5: Hamstring stretch 3 x 30"  Balance/Neuromuscular Re-Education  NMR 1: Quad sets towel under knee x 30 5" holds  NMR 2: Bridges 3 x 10  NMR 3: SLR 2 x 10 w/therapist assist  NMR 4: SL hip abduction 2 x 10  NMR 5: LAQ 2 x 10  Therapeutic Activity  TA 1: Standing heel raises x 30  TA 2: " "Standing marches/hip abd/hip ext 2 x 10 ea  TA 3: Sit to stands 2 x 10  TA 4: Step ups 4" step 2 x 10    Time Entry(in minutes):  Neuromuscular Re-Education Time Entry: 25  Therapeutic Activity Time Entry: 10  Therapeutic Exercise Time Entry: 18    Assessment & Plan   Assessment: Martha returned reporting continued mild R knee pain levels. She demonstrated improved gait quality with less antalgic gait and no longer utilizing SPC. Slight extensor lag still noted during SLR. Workload increased with the addition of step ups on 4 inch step. Good tolerance with no increase in pain appropriate increase in muscular fatigue with exercise progressions. Will continue to progress per pt's tolerance.         The patient will continue to benefit from skilled outpatient physical therapy in order to address the deficits listed in the problem list on the initial evaluation, provide patient and family education, and maximize the patients level of independence in the home and community environments.     The patient's spiritual, cultural, and educational needs were considered, and the patient is agreeable to the plan of care and goals.           Plan: Continue with POC    Goals:   Active       A) STGs       HEP       Start:  06/23/25    Expected End:  07/21/25       Pt will be independent and compliant with her HEP to impact their progress towards their goals           Pain       Start:  06/23/25    Expected End:  07/21/25       Patient will report their resting, average right knee pain as </= 0/10 to impact their QoL.          range of motion       Start:  06/23/25    Expected End:  07/21/25       Patient will improve her right knee flexion PROM to >/= 115 degrees to impact her bed mobility         Strength        Start:  06/23/25    Expected End:  07/21/25       Patient will improve their right knee manual muscle test score to 4/5 to demonstrate improvements in functional strength          Gait       Start:  06/23/25    Expected " End:  07/21/25       Patient will be able to ambulate community and household distances without need for SPC to demonstrate improvements in her confidence in her balance            B) LTGs       FOTO       Start:  06/23/25    Expected End:  09/15/25       Patient will improve their FOTO score to >/= 78 to demonstrate clinically significant improvements in function and ADL performance          Pain       Start:  06/23/25    Expected End:  09/15/25       Patient will report worst pain in her right knee as </= 1/10 to impact her tolerance to performing physical activity         range of motion       Start:  06/23/25    Expected End:  09/15/25       Patient will improve her right knee flexion AROM to >/= 120 degrees to impact her ability to ascend/descend stairs         Strength       Start:  06/23/25    Expected End:  09/15/25       Patient will improve their R knee manual muscle test score to 5/5 to demonstrate improvements in functional strength          Transfers       Start:  06/23/25    Expected End:  09/15/25       Patient will improve her 30 second sit to stand test to >/= 9 repetitions to demonstrate improvements in functional LE strength                Rico Gagnon, PTA

## 2025-07-02 ENCOUNTER — CLINICAL SUPPORT (OUTPATIENT)
Dept: REHABILITATION | Facility: HOSPITAL | Age: 70
End: 2025-07-02
Attending: ORTHOPAEDIC SURGERY
Payer: MEDICARE

## 2025-07-02 DIAGNOSIS — M25.561 CHRONIC PAIN OF RIGHT KNEE: Primary | ICD-10-CM

## 2025-07-02 DIAGNOSIS — G89.29 CHRONIC PAIN OF RIGHT KNEE: Primary | ICD-10-CM

## 2025-07-02 DIAGNOSIS — R26.9 ABNORMAL GAIT: ICD-10-CM

## 2025-07-02 DIAGNOSIS — Z96.651 STATUS POST TOTAL RIGHT KNEE REPLACEMENT: ICD-10-CM

## 2025-07-02 DIAGNOSIS — M25.661 DECREASED RANGE OF MOTION (ROM) OF RIGHT KNEE: ICD-10-CM

## 2025-07-02 PROCEDURE — 97112 NEUROMUSCULAR REEDUCATION: CPT | Mod: PO

## 2025-07-02 PROCEDURE — 97530 THERAPEUTIC ACTIVITIES: CPT | Mod: PO

## 2025-07-02 NOTE — PROGRESS NOTES
"  Outpatient Rehab    Physical Therapy Visit    Patient Name: Martha Altman  MRN: [id number]  YOB: 1955  Encounter Date: 7/2/2025    Therapy Diagnosis:   Encounter Diagnoses   Name Primary?    Chronic pain of right knee Yes    Decreased range of motion (ROM) of right knee     Status post total right knee replacement     Abnormal gait      Physician: Romulo Lange MD    Physician Orders: Eval and Treat  Medical Diagnosis: Status post total right knee replacement  Traumatic wound dehiscence, initial encounter  Surgical Diagnosis: Not applicable for this Episode   Surgical Date: 6/3/2025  Days Since Last Surgery: 29    Visit # / Visits Authorized:  4 / 20  Insurance Authorization Period: 6/23/2025 to 9/15/2025  Date of Evaluation: 6/23/2025  Plan of Care Certification: 6/23/2025 to 9/15/2025      PT/PTA:     Number of PTA visits since last PT visit:   Time In: 1302   Time Out: 1406  Total Time (in minutes): 64   Total Billable Time (in minutes): 38    FOTO:  Intake Score:  %  Survey Score 2:  %  Survey Score 3:  %    Precautions:       Subjective   Pt reports that her knee feels like it's getting better. Feels like she can move well once she gets started..  Pain reported as 1/10. R knee    Objective            Treatment:  Therapeutic Exercise  TE 2: Supine heel slides x 20 5" holds  TE 4: Gastroc stretch on incline board 3 x 30"  Manual Therapy  MT 1: TF distraction  MT 2: Manual PROM into flexion as tolerated  Balance/Neuromuscular Re-Education  NMR 1: NMES quad sets with heel prop 10/10 on VMO and RF x8:00  NMR 2: Bridges 3 x 10  NMR 3: SLR 2 x 10  NMR 5: LAQ 2 x 10  Therapeutic Activity  TA 1: Standing heel raises 2x15  TA 3: Sit to stands 2 x 10  TA 4: Step ups 4" step 2 x 10    Time Entry(in minutes):  Manual Therapy Time Entry: 10  Neuromuscular Re-Education Time Entry: 29  Therapeutic Activity Time Entry: 15  Therapeutic Exercise Time Entry: 10    Assessment & Plan   Assessment: Martha" returned reporting continued mild R knee pain levels. She demonstrated improved gait quality with less antalgic gait and no longer utilizing SPC. Slight extensor lag still noted during SLR. Continued with NMES during quad setting activities to improve quadriceps contractility. Unable to achieve terminal knee extension during open chain knee extension as well.  Evaluation/Treatment Tolerance: Patient tolerated treatment well    The patient will continue to benefit from skilled outpatient physical therapy in order to address the deficits listed in the problem list on the initial evaluation, provide patient and family education, and maximize the patients level of independence in the home and community environments.     The patient's spiritual, cultural, and educational needs were considered, and the patient is agreeable to the plan of care and goals.           Plan: Continue with POC    Goals:   Active       A) STGs       HEP       Start:  06/23/25    Expected End:  07/21/25       Pt will be independent and compliant with her HEP to impact their progress towards their goals           Pain       Start:  06/23/25    Expected End:  07/21/25       Patient will report their resting, average right knee pain as </= 0/10 to impact their QoL.          range of motion       Start:  06/23/25    Expected End:  07/21/25       Patient will improve her right knee flexion PROM to >/= 115 degrees to impact her bed mobility         Strength        Start:  06/23/25    Expected End:  07/21/25       Patient will improve their right knee manual muscle test score to 4/5 to demonstrate improvements in functional strength          Gait       Start:  06/23/25    Expected End:  07/21/25       Patient will be able to ambulate community and household distances without need for SPC to demonstrate improvements in her confidence in her balance            B) LTGs       FOTO       Start:  06/23/25    Expected End:  09/15/25       Patient will improve  their FOTO score to >/= 78 to demonstrate clinically significant improvements in function and ADL performance          Pain       Start:  06/23/25    Expected End:  09/15/25       Patient will report worst pain in her right knee as </= 1/10 to impact her tolerance to performing physical activity         range of motion       Start:  06/23/25    Expected End:  09/15/25       Patient will improve her right knee flexion AROM to >/= 120 degrees to impact her ability to ascend/descend stairs         Strength       Start:  06/23/25    Expected End:  09/15/25       Patient will improve their R knee manual muscle test score to 5/5 to demonstrate improvements in functional strength          Transfers       Start:  06/23/25    Expected End:  09/15/25       Patient will improve her 30 second sit to stand test to >/= 9 repetitions to demonstrate improvements in functional LE strength              Jake Watson, PT

## 2025-07-03 ENCOUNTER — CLINICAL SUPPORT (OUTPATIENT)
Dept: REHABILITATION | Facility: HOSPITAL | Age: 70
End: 2025-07-03
Attending: ORTHOPAEDIC SURGERY
Payer: MEDICARE

## 2025-07-03 DIAGNOSIS — Z96.651 STATUS POST TOTAL RIGHT KNEE REPLACEMENT: Primary | ICD-10-CM

## 2025-07-03 PROCEDURE — 97112 NEUROMUSCULAR REEDUCATION: CPT | Mod: PO,CQ

## 2025-07-03 PROCEDURE — 97110 THERAPEUTIC EXERCISES: CPT | Mod: PO,CQ

## 2025-07-03 NOTE — PROGRESS NOTES
"    Outpatient Rehab    Physical Therapy Visit    Patient Name: Martha Altman  MRN: [id number]  YOB: 1955  Encounter Date: 7/3/2025    Therapy Diagnosis:   Encounter Diagnosis   Name Primary?    Status post total right knee replacement Yes       Physician: Romulo Lange MD    Physician Orders: Eval and Treat  Medical Diagnosis: Status post total right knee replacement  Traumatic wound dehiscence, initial encounter  Surgical Diagnosis: Not applicable for this Episode   Surgical Date: 6/3/2025  Days Since Last Surgery: 33    Visit # / Visits Authorized:  5 / 20  Insurance Authorization Period: 6/23/2025 to 9/15/2025  Date of Evaluation: 6/23/2025  Plan of Care Certification: 6/23/2025 to 9/15/2025      PT/PTA: PTA   Number of PTA visits since last PT visit:1  Time In: 1300   Time Out: 1355  Total Time (in minutes): 55   Total Billable Time (in minutes): 55    FOTO:  Intake Score:  %  Survey Score 2:  %  Survey Score 3:  %    Precautions:       Subjective   Pt reports she is sore from last visit..  Pain reported as 4/10. R knee    Objective            Treatment:  Therapeutic Exercise  TE 1: Nu Step x 8 min  TE 2: Supine heel slides x 20 5" holds  TE 3: Knee ext heel prop x 3 minutes  TE 4: Gastroc stretch on incline board 3 x 30"  TE 5: Hamstring stretch 3 x 30"  Balance/Neuromuscular Re-Education  NMR 1: NMES quad sets with heel prop 10/10 on VMO and RF x8:00  NMR 2: Bridges 3 x 10  NMR 3: SLR 2 x 10  NMR 4: SL hip abduction 2 x 10  NMR 5: LAQ 2 x 10  Therapeutic Activity  TA 3: Sit to stands 2 x 10    Time Entry(in minutes):  Neuromuscular Re-Education Time Entry: 30  Therapeutic Exercise Time Entry: 25    Assessment & Plan   Assessment: Martha returned reporting continued mild R knee today that slightly improved with session. She conts to have slight extensor lag with SLR. Continued with NMES during quad setting activities to improve quadriceps contraction. Unable to achieve terminal knee " extension during open chain knee extension as well. Cont with NMES and quad strengthening to maximize TKE.  Evaluation/Treatment Tolerance: Patient tolerated treatment well    The patient will continue to benefit from skilled outpatient physical therapy in order to address the deficits listed in the problem list on the initial evaluation, provide patient and family education, and maximize the patients level of independence in the home and community environments.     The patient's spiritual, cultural, and educational needs were considered, and the patient is agreeable to the plan of care and goals.     Education  Education was done with Patient. The patient's learning style includes Demonstration and Listening. The patient Demonstrates understanding and Verbalizes understanding.                 Plan: Continue with POC    Goals:   Active       A) STGs       HEP       Start:  06/23/25    Expected End:  07/21/25       Pt will be independent and compliant with her HEP to impact their progress towards their goals           Pain       Start:  06/23/25    Expected End:  07/21/25       Patient will report their resting, average right knee pain as </= 0/10 to impact their QoL.          range of motion       Start:  06/23/25    Expected End:  07/21/25       Patient will improve her right knee flexion PROM to >/= 115 degrees to impact her bed mobility         Strength        Start:  06/23/25    Expected End:  07/21/25       Patient will improve their right knee manual muscle test score to 4/5 to demonstrate improvements in functional strength          Gait       Start:  06/23/25    Expected End:  07/21/25       Patient will be able to ambulate community and household distances without need for SPC to demonstrate improvements in her confidence in her balance            B) LTGs       FOTO       Start:  06/23/25    Expected End:  09/15/25       Patient will improve their FOTO score to >/= 78 to demonstrate clinically significant  improvements in function and ADL performance          Pain       Start:  06/23/25    Expected End:  09/15/25       Patient will report worst pain in her right knee as </= 1/10 to impact her tolerance to performing physical activity         range of motion       Start:  06/23/25    Expected End:  09/15/25       Patient will improve her right knee flexion AROM to >/= 120 degrees to impact her ability to ascend/descend stairs         Strength       Start:  06/23/25    Expected End:  09/15/25       Patient will improve their R knee manual muscle test score to 5/5 to demonstrate improvements in functional strength          Transfers       Start:  06/23/25    Expected End:  09/15/25       Patient will improve her 30 second sit to stand test to >/= 9 repetitions to demonstrate improvements in functional LE strength                Kaylie Skinner PTA

## 2025-07-08 ENCOUNTER — CLINICAL SUPPORT (OUTPATIENT)
Dept: REHABILITATION | Facility: HOSPITAL | Age: 70
End: 2025-07-08
Attending: ORTHOPAEDIC SURGERY
Payer: MEDICARE

## 2025-07-08 DIAGNOSIS — M25.661 DECREASED RANGE OF MOTION (ROM) OF RIGHT KNEE: ICD-10-CM

## 2025-07-08 DIAGNOSIS — G89.29 CHRONIC PAIN OF RIGHT KNEE: ICD-10-CM

## 2025-07-08 DIAGNOSIS — R26.9 ABNORMAL GAIT: ICD-10-CM

## 2025-07-08 DIAGNOSIS — M25.561 CHRONIC PAIN OF RIGHT KNEE: ICD-10-CM

## 2025-07-08 DIAGNOSIS — Z96.651 STATUS POST TOTAL RIGHT KNEE REPLACEMENT: Primary | ICD-10-CM

## 2025-07-08 PROCEDURE — 97530 THERAPEUTIC ACTIVITIES: CPT | Mod: PO

## 2025-07-08 PROCEDURE — 97110 THERAPEUTIC EXERCISES: CPT | Mod: PO

## 2025-07-08 PROCEDURE — 97112 NEUROMUSCULAR REEDUCATION: CPT | Mod: PO

## 2025-07-08 NOTE — PROGRESS NOTES
"  Outpatient Rehab    Physical Therapy Visit    Patient Name: Martha Altman  MRN: [id number]  YOB: 1955  Encounter Date: 7/8/2025    Therapy Diagnosis:   Encounter Diagnoses   Name Primary?    Status post total right knee replacement Yes    Chronic pain of right knee     Decreased range of motion (ROM) of right knee     Abnormal gait      Physician: Romulo Lange MD    Physician Orders: Eval and Treat  Medical Diagnosis: Status post total right knee replacement  Traumatic wound dehiscence, initial encounter  Surgical Diagnosis: Not applicable for this Episode   Surgical Date: 6/3/2025  Days Since Last Surgery: 35    Visit # / Visits Authorized:  6 / 20  Insurance Authorization Period: 6/23/2025 to 9/15/2025  Date of Evaluation: 6/23/2025  Plan of Care Certification: 6/23/2025 to 9/15/2025      PT/PTA:     Number of PTA visits since last PT visit:   Time In: 1101   Time Out: 1159  Total Time (in minutes): 58   Total Billable Time (in minutes): 29    FOTO:  Intake Score:  %  Survey Score 2:  %  Survey Score 3:  %    Precautions:       Subjective   Pt reports to PT feeling well this morning. She complains of minor knee pain, but states it's only a dull ache..  Pain reported as 2/10. R knee    Objective            Treatment:  Therapeutic Exercise  TE 2: Supine heel slides x 20 5" holds  TE 3: Knee ext heel prop x 3 minutes  Balance/Neuromuscular Re-Education  NMR 1: NMES quad sets with heel prop 10/10 on VMO and RF x8:00  NMR 2: Bridges 3 x 10  NMR 3: SLR 2 x 10 R LE  NMR 5: LAQ 2 x 10 R LE  Therapeutic Activity  TA 2: Standing marches/hip abd/hip ext 2 x 10 ea  TA 3: Sit to stands 2 x 10  TA 4: Step ups 4" step 2 x 10    Time Entry(in minutes):  Neuromuscular Re-Education Time Entry: 30  Therapeutic Activity Time Entry: 16  Therapeutic Exercise Time Entry: 12    Assessment & Plan   Assessment: Patient with good tolerance to all exercises performed this visit. Continue to focus on quadriceps " contractility and strength, as well as range of motion in both extension and flexion. She seems to be tolerating her physical therapy well in general without much pain or soreness reported between visits. Will continue to focus on improving terminal knee extension and quadriceps strength, as well as restore overall range of motion at her right knee.  Evaluation/Treatment Tolerance: Patient tolerated treatment well    The patient will continue to benefit from skilled outpatient physical therapy in order to address the deficits listed in the problem list on the initial evaluation, provide patient and family education, and maximize the patients level of independence in the home and community environments.     The patient's spiritual, cultural, and educational needs were considered, and the patient is agreeable to the plan of care and goals.           Plan: Continue with POC    Goals:   Active       A) STGs       HEP       Start:  06/23/25    Expected End:  07/21/25       Pt will be independent and compliant with her HEP to impact their progress towards their goals           Pain       Start:  06/23/25    Expected End:  07/21/25       Patient will report their resting, average right knee pain as </= 0/10 to impact their QoL.          range of motion       Start:  06/23/25    Expected End:  07/21/25       Patient will improve her right knee flexion PROM to >/= 115 degrees to impact her bed mobility         Strength        Start:  06/23/25    Expected End:  07/21/25       Patient will improve their right knee manual muscle test score to 4/5 to demonstrate improvements in functional strength          Gait       Start:  06/23/25    Expected End:  07/21/25       Patient will be able to ambulate community and household distances without need for SPC to demonstrate improvements in her confidence in her balance            B) LTGs       FOTO       Start:  06/23/25    Expected End:  09/15/25       Patient will improve their FOTO  score to >/= 78 to demonstrate clinically significant improvements in function and ADL performance          Pain       Start:  06/23/25    Expected End:  09/15/25       Patient will report worst pain in her right knee as </= 1/10 to impact her tolerance to performing physical activity         range of motion       Start:  06/23/25    Expected End:  09/15/25       Patient will improve her right knee flexion AROM to >/= 120 degrees to impact her ability to ascend/descend stairs         Strength       Start:  06/23/25    Expected End:  09/15/25       Patient will improve their R knee manual muscle test score to 5/5 to demonstrate improvements in functional strength          Transfers       Start:  06/23/25    Expected End:  09/15/25       Patient will improve her 30 second sit to stand test to >/= 9 repetitions to demonstrate improvements in functional LE strength              Jake Watson, PT

## 2025-07-10 ENCOUNTER — CLINICAL SUPPORT (OUTPATIENT)
Dept: REHABILITATION | Facility: HOSPITAL | Age: 70
End: 2025-07-10
Attending: ORTHOPAEDIC SURGERY
Payer: MEDICARE

## 2025-07-10 DIAGNOSIS — M25.561 CHRONIC PAIN OF RIGHT KNEE: ICD-10-CM

## 2025-07-10 DIAGNOSIS — M25.661 DECREASED RANGE OF MOTION (ROM) OF RIGHT KNEE: ICD-10-CM

## 2025-07-10 DIAGNOSIS — Z96.651 STATUS POST TOTAL RIGHT KNEE REPLACEMENT: Primary | ICD-10-CM

## 2025-07-10 DIAGNOSIS — R26.9 ABNORMAL GAIT: ICD-10-CM

## 2025-07-10 DIAGNOSIS — G89.29 CHRONIC PAIN OF RIGHT KNEE: ICD-10-CM

## 2025-07-10 PROCEDURE — 97112 NEUROMUSCULAR REEDUCATION: CPT | Mod: PO,CQ

## 2025-07-10 PROCEDURE — 97530 THERAPEUTIC ACTIVITIES: CPT | Mod: PO,CQ

## 2025-07-10 PROCEDURE — 97110 THERAPEUTIC EXERCISES: CPT | Mod: PO,CQ

## 2025-07-10 NOTE — PROGRESS NOTES
"  Outpatient Rehab    Physical Therapy Visit    Patient Name: Martha Altman  MRN: [id number]  YOB: 1955  Encounter Date: 7/10/2025    Therapy Diagnosis:   Encounter Diagnoses   Name Primary?    Status post total right knee replacement      Traumatic wound dehiscence, initial encounter      Chronic pain of right knee Yes    Abnormal gait      Decreased range of motion (ROM) of right knee        Physician: Romulo Lange MD    Physician Orders: Eval and Treat  Medical Diagnosis: Status post total right knee replacement  Traumatic wound dehiscence, initial encounter  Surgical Diagnosis: Not applicable for this Episode   Surgical Date: 6/3/2025  Days Since Last Surgery: 37    Visit # / Visits Authorized:  7 / 20  Insurance Authorization Period: 6/23/2025 to 9/15/2025  Date of Evaluation: 6/23/2025  Plan of Care Certification: 6/23/2025 to 9/15/2025      PT/PTA: PTA   Number of PTA visits since last PT visit:1  Time In: 1054   Time Out: 1157  Total Time (in minutes): 63   Total Billable Time (in minutes): 58    FOTO:  Intake Score: 54%  Survey Score 2:  %  Survey Score 3:  %    Precautions:       Subjective   Pt reports continued min R knee pain. She states "the only time I take the pain meds now is before PT".  Pain reported as 2/10.      Objective      Knee Range of Motion   Right Knee    Active (deg)   Flexion 110   Extension 1         Treatment:  Therapeutic Exercise  TE 1: Nu Step x 8 min  TE 2: Supine heel slides x 20 5" holds  TE 3: Knee ext heel prop 4# x 3 minutes  TE 4: Gastroc stretch on incline board 3 x 30"  TE 5: Hamstring stretch 3 x 30"  Balance/Neuromuscular Re-Education  NMR 1: NMES quad sets with heel prop 10/10 on VMO and RF x8:00  NMR 2: Bridges 3 x 10  NMR 3: SLR 2 x 10 R LE  NMR 5: LAQ 2 x 10 R LE  Therapeutic Activity  TA 2: Standing marches/hip abd/hip ext 2 x 10 ea  TA 3: Sit to stands 2 x 10  TA 4: Step ups 4" step 2 x 10    Time Entry(in minutes):  Hot/Cold Pack Time " Entry: 5  Neuromuscular Re-Education Time Entry: 30  Therapeutic Activity Time Entry: 15  Therapeutic Exercise Time Entry: 13    Assessment & Plan   Assessment: Continued focus on quad activation and strengthening. NMS was again performed to address pt's continued extensor lag with SLR. Pt demonstrated improved active R knee ROM, measuring at 1-110 degrees of flexion. Good tolerance with no adverse effects. Will continue to progress per pt's tolerance.       The patient will continue to benefit from skilled outpatient physical therapy in order to address the deficits listed in the problem list on the initial evaluation, provide patient and family education, and maximize the patients level of independence in the home and community environments.     The patient's spiritual, cultural, and educational needs were considered, and the patient is agreeable to the plan of care and goals.           Plan: Continue with POC    Goals:   Active       A) STGs       HEP       Start:  06/23/25    Expected End:  07/21/25       Pt will be independent and compliant with her HEP to impact their progress towards their goals           Pain       Start:  06/23/25    Expected End:  07/21/25       Patient will report their resting, average right knee pain as </= 0/10 to impact their QoL.          range of motion       Start:  06/23/25    Expected End:  07/21/25       Patient will improve her right knee flexion PROM to >/= 115 degrees to impact her bed mobility         Strength        Start:  06/23/25    Expected End:  07/21/25       Patient will improve their right knee manual muscle test score to 4/5 to demonstrate improvements in functional strength          Gait       Start:  06/23/25    Expected End:  07/21/25       Patient will be able to ambulate community and household distances without need for SPC to demonstrate improvements in her confidence in her balance            B) LTGs       FOTO       Start:  06/23/25    Expected End:   09/15/25       Patient will improve their FOTO score to >/= 78 to demonstrate clinically significant improvements in function and ADL performance          Pain       Start:  06/23/25    Expected End:  09/15/25       Patient will report worst pain in her right knee as </= 1/10 to impact her tolerance to performing physical activity         range of motion       Start:  06/23/25    Expected End:  09/15/25       Patient will improve her right knee flexion AROM to >/= 120 degrees to impact her ability to ascend/descend stairs         Strength       Start:  06/23/25    Expected End:  09/15/25       Patient will improve their R knee manual muscle test score to 5/5 to demonstrate improvements in functional strength          Transfers       Start:  06/23/25    Expected End:  09/15/25       Patient will improve her 30 second sit to stand test to >/= 9 repetitions to demonstrate improvements in functional LE strength              Rico Gagnon, PTA

## 2025-07-15 ENCOUNTER — CLINICAL SUPPORT (OUTPATIENT)
Dept: REHABILITATION | Facility: HOSPITAL | Age: 70
End: 2025-07-15
Attending: ORTHOPAEDIC SURGERY
Payer: MEDICARE

## 2025-07-15 DIAGNOSIS — Z96.651 STATUS POST TOTAL RIGHT KNEE REPLACEMENT: Primary | ICD-10-CM

## 2025-07-15 PROCEDURE — 97112 NEUROMUSCULAR REEDUCATION: CPT | Mod: PO,CQ

## 2025-07-15 PROCEDURE — 97110 THERAPEUTIC EXERCISES: CPT | Mod: PO,CQ

## 2025-07-15 NOTE — PROGRESS NOTES
"  Outpatient Rehab    Physical Therapy Visit    Patient Name: Martha Altman  MRN: [id number]  YOB: 1955  Encounter Date: 7/15/2025    Therapy Diagnosis:   Encounter Diagnoses   Name Primary?    Status post total right knee replacement      Traumatic wound dehiscence, initial encounter      Chronic pain of right knee Yes    Abnormal gait      Decreased range of motion (ROM) of right knee        Physician: Romulo Lange MD    Physician Orders: Eval and Treat  Medical Diagnosis: Status post total right knee replacement  Traumatic wound dehiscence, initial encounter  Surgical Diagnosis: Not applicable for this Episode   Surgical Date: 6/3/2025  Days Since Last Surgery: 42    Visit # / Visits Authorized:  8 / 20  Insurance Authorization Period: 6/23/2025 to 9/15/2025  Date of Evaluation: 6/23/2025  Plan of Care Certification: 6/23/2025 to 9/15/2025      PT/PTA: PTA   Number of PTA visits since last PT visit:2  Time In: 1100   Time Out: 1200  Total Time (in minutes): 60   Total Billable Time (in minutes): 60    FOTO:  Intake Score:  %  Survey Score 2:  %  Survey Score 3:  %    Precautions:       Subjective             Objective      Knee Range of Motion   Right Knee    Active (deg)   Flexion 110   Extension 1         Treatment:  Therapeutic Exercise  TE 2: Supine heel slides x 20 5" holds  TE 4: Gastroc stretch on incline board 3 x 30"  TE 5: Hamstring stretch 3 x 30"  TE 6: Recumbent bike 5 mins (half revolution)  Balance/Neuromuscular Re-Education  NMR 1: NMES quad sets with heel prop 10/10 on VMO and RF x8:00  NMR 2: Bridges 3 x 10  NMR 3: SLR 2 x 10 R LE  NMR 5: LAQ 3 x 10 R LE  NMR 6: Standing TKE YTB (tactile/vc cueing for quad contraction w/ TKE, full WB)  NMR 7: Leg press 20x 2x10(tactile/vc cueing for quad contraction with TKE)  Therapeutic Activity  TA 2: Standing marches/hip abd/hip ext 2 x 10 ea    Time Entry(in minutes):  Neuromuscular Re-Education Time Entry: 35  Therapeutic Activity " Time Entry: 5  Therapeutic Exercise Time Entry: 20    Assessment & Plan   Assessment: Continued focus on quad activation and strengthening. NMS was again performed to address pt's continued extensor lag. Pt tolerated a progression of quad NM re-education  in weight bearing with good TKE with tactile/vc cueing. No give with therex. She needed vc and demo of heel to toe gait tech with emphasis of TKE. Pt demo well and was able to achieve correct tech. Good tolerance with no adverse effects. Will continue to progress per pt's tolerance.  Evaluation/Treatment Tolerance: Patient tolerated treatment well    The patient will continue to benefit from skilled outpatient physical therapy in order to address the deficits listed in the problem list on the initial evaluation, provide patient and family education, and maximize the patients level of independence in the home and community environments.     The patient's spiritual, cultural, and educational needs were considered, and the patient is agreeable to the plan of care and goals.     Education  Education was done with Patient. The patient's learning style includes Demonstration and Listening. The patient Demonstrates understanding and Verbalizes understanding.                 Plan: Continue with POC    Goals:   Active       A) STGs       HEP       Start:  06/23/25    Expected End:  07/21/25       Pt will be independent and compliant with her HEP to impact their progress towards their goals           Pain       Start:  06/23/25    Expected End:  07/21/25       Patient will report their resting, average right knee pain as </= 0/10 to impact their QoL.          range of motion       Start:  06/23/25    Expected End:  07/21/25       Patient will improve her right knee flexion PROM to >/= 115 degrees to impact her bed mobility         Strength        Start:  06/23/25    Expected End:  07/21/25       Patient will improve their right knee manual muscle test score to 4/5 to  demonstrate improvements in functional strength          Gait       Start:  06/23/25    Expected End:  07/21/25       Patient will be able to ambulate community and household distances without need for SPC to demonstrate improvements in her confidence in her balance            B) LTGs       FOTO       Start:  06/23/25    Expected End:  09/15/25       Patient will improve their FOTO score to >/= 78 to demonstrate clinically significant improvements in function and ADL performance          Pain       Start:  06/23/25    Expected End:  09/15/25       Patient will report worst pain in her right knee as </= 1/10 to impact her tolerance to performing physical activity         range of motion       Start:  06/23/25    Expected End:  09/15/25       Patient will improve her right knee flexion AROM to >/= 120 degrees to impact her ability to ascend/descend stairs         Strength       Start:  06/23/25    Expected End:  09/15/25       Patient will improve their R knee manual muscle test score to 5/5 to demonstrate improvements in functional strength          Transfers       Start:  06/23/25    Expected End:  09/15/25       Patient will improve her 30 second sit to stand test to >/= 9 repetitions to demonstrate improvements in functional LE strength                Kaylie Skinner PTA

## 2025-07-17 ENCOUNTER — OFFICE VISIT (OUTPATIENT)
Dept: ORTHOPEDICS | Facility: CLINIC | Age: 70
End: 2025-07-17
Payer: MEDICARE

## 2025-07-17 ENCOUNTER — CLINICAL SUPPORT (OUTPATIENT)
Dept: REHABILITATION | Facility: HOSPITAL | Age: 70
End: 2025-07-17
Attending: ORTHOPAEDIC SURGERY
Payer: MEDICARE

## 2025-07-17 ENCOUNTER — HOSPITAL ENCOUNTER (OUTPATIENT)
Dept: RADIOLOGY | Facility: HOSPITAL | Age: 70
Discharge: HOME | End: 2025-07-17
Attending: ORTHOPAEDIC SURGERY
Payer: MEDICARE

## 2025-07-17 DIAGNOSIS — T81.33XA TRAUMATIC WOUND DEHISCENCE, INITIAL ENCOUNTER: ICD-10-CM

## 2025-07-17 DIAGNOSIS — M25.661 DECREASED RANGE OF MOTION (ROM) OF RIGHT KNEE: ICD-10-CM

## 2025-07-17 DIAGNOSIS — Z98.890 S/P RIGHT KNEE SURGERY: Primary | ICD-10-CM

## 2025-07-17 DIAGNOSIS — Z96.651 STATUS POST TOTAL RIGHT KNEE REPLACEMENT: ICD-10-CM

## 2025-07-17 DIAGNOSIS — M25.561 CHRONIC PAIN OF RIGHT KNEE: ICD-10-CM

## 2025-07-17 DIAGNOSIS — Z96.651 STATUS POST TOTAL RIGHT KNEE REPLACEMENT: Primary | ICD-10-CM

## 2025-07-17 DIAGNOSIS — R26.9 ABNORMAL GAIT: Primary | ICD-10-CM

## 2025-07-17 DIAGNOSIS — G89.29 CHRONIC PAIN OF RIGHT KNEE: ICD-10-CM

## 2025-07-17 PROCEDURE — 73560 X-RAY EXAM OF KNEE 1 OR 2: CPT | Mod: 26,LT,, | Performed by: RADIOLOGY

## 2025-07-17 PROCEDURE — 73560 X-RAY EXAM OF KNEE 1 OR 2: CPT | Mod: TC,PO,LT

## 2025-07-17 PROCEDURE — 73562 X-RAY EXAM OF KNEE 3: CPT | Mod: 26,RT,, | Performed by: RADIOLOGY

## 2025-07-17 PROCEDURE — 97530 THERAPEUTIC ACTIVITIES: CPT | Mod: PO

## 2025-07-17 PROCEDURE — 97112 NEUROMUSCULAR REEDUCATION: CPT | Mod: PO

## 2025-07-17 PROCEDURE — 99999 PR PBB SHADOW E&M-EST. PATIENT-LVL III: CPT | Mod: PBBFAC,,, | Performed by: ORTHOPAEDIC SURGERY

## 2025-07-17 NOTE — PROGRESS NOTES
"  Outpatient Rehab    Physical Therapy Visit    Patient Name: Martha Altman  MRN: [id number]  YOB: 1955  Encounter Date: 7/17/2025    Therapy Diagnosis:   Encounter Diagnoses   Name Primary?    Abnormal gait Yes    Status post total right knee replacement     Chronic pain of right knee     Decreased range of motion (ROM) of right knee      Physician: Romulo Lange MD    Physician Orders: Eval and Treat  Medical Diagnosis: Status post total right knee replacement  Traumatic wound dehiscence, initial encounter  Surgical Diagnosis: Not applicable for this Episode   Surgical Date: 6/3/2025  Days Since Last Surgery: 44    Visit # / Visits Authorized:  9 / 20  Insurance Authorization Period: 6/23/2025 to 9/15/2025  Date of Evaluation: 6/23/2025  Plan of Care Certification: 6/23/2025 to 9/15/2025      PT/PTA:     Number of PTA visits since last PT visit:   Time In: 1300   Time Out: 1404  Total Time (in minutes): 64   Total Billable Time (in minutes): 34    FOTO:  Intake Score: 54%  Survey Score 2: Not applicable for this Episode%  Survey Score 3: Not applicable for this Episode%    Precautions:         Subjective   Pt reports that her knee is doing good. States that she was sore after her previous visit where progressions were made..  Pain reported as 0/10. R knee    Objective            Treatment:  Therapeutic Exercise  TE 2: Supine heel slides x 20 5" holds  TE 4: Gastroc stretch on incline board 3 x 30"  TE 5: Hamstring stretch 3 x 30"  Balance/Neuromuscular Re-Education  NMR 1: NMES quad sets with heel prop 10/10 on VMO and RF x8:00  NMR 2: Bridges 3 x 10  NMR 3: SLR 2 x 10 R LE  NMR 5: LAQ 3 x 10 R LE  NMR 6: Standing TKE YTB (tactile/vc cueing for quad contraction w/ TKE, full WB) 2x15  NMR 7: Leg press 20x 2x10(tactile/vc cueing for quad contraction with TKE) 30#  Therapeutic Activity  TA 2: Standing marches/hip abd/hip ext 2 x 10 ea  TA 3: Sit to stands 2 x 10  TA 4: Step ups 4" step 2 x " 10  Modalities  Cryotherapy (Minutes\Location): 6 minutes R knee    Time Entry(in minutes):  Hot/Cold Pack Time Entry: 6  Neuromuscular Re-Education Time Entry: 30  Therapeutic Activity Time Entry: 14  Therapeutic Exercise Time Entry: 14    Assessment & Plan   Assessment: Continued focus on quad activation and strengthening. NMS was again performed to address pt's continued extensor lag. Pt tolerated a progression of quad NM re-education in weight bearing with good TKE with tactile/vc cueing. Good tolerance to CKC exercises performed today. She seems to experience post-exercise soreness that following a few days of rest. Will progress as she is able to tolerate.  Evaluation/Treatment Tolerance: Patient tolerated treatment well    The patient will continue to benefit from skilled outpatient physical therapy in order to address the deficits listed in the problem list on the initial evaluation, provide patient and family education, and maximize the patients level of independence in the home and community environments.     The patient's spiritual, cultural, and educational needs were considered, and the patient is agreeable to the plan of care and goals.           Plan: Continue with POC    Goals:   Active       A) STGs       HEP       Start:  06/23/25    Expected End:  07/21/25       Pt will be independent and compliant with her HEP to impact their progress towards their goals           Pain       Start:  06/23/25    Expected End:  07/21/25       Patient will report their resting, average right knee pain as </= 0/10 to impact their QoL.          range of motion       Start:  06/23/25    Expected End:  07/21/25       Patient will improve her right knee flexion PROM to >/= 115 degrees to impact her bed mobility         Strength        Start:  06/23/25    Expected End:  07/21/25       Patient will improve their right knee manual muscle test score to 4/5 to demonstrate improvements in functional strength          Gait        Start:  06/23/25    Expected End:  07/21/25       Patient will be able to ambulate community and household distances without need for SPC to demonstrate improvements in her confidence in her balance            B) LTGs       FOTO       Start:  06/23/25    Expected End:  09/15/25       Patient will improve their FOTO score to >/= 78 to demonstrate clinically significant improvements in function and ADL performance          Pain       Start:  06/23/25    Expected End:  09/15/25       Patient will report worst pain in her right knee as </= 1/10 to impact her tolerance to performing physical activity         range of motion       Start:  06/23/25    Expected End:  09/15/25       Patient will improve her right knee flexion AROM to >/= 120 degrees to impact her ability to ascend/descend stairs         Strength       Start:  06/23/25    Expected End:  09/15/25       Patient will improve their R knee manual muscle test score to 5/5 to demonstrate improvements in functional strength          Transfers       Start:  06/23/25    Expected End:  09/15/25       Patient will improve her 30 second sit to stand test to >/= 9 repetitions to demonstrate improvements in functional LE strength              Jake Watson, PT

## 2025-07-17 NOTE — PROGRESS NOTES
Six weeks out from washout and closure dehisced surgical wound from fall after total knee arthroplasty.  Patient doing well     Exam shows no signs infection good motion strength     X-rays show well placed components     Plan: Continue with therapy, we will give him prescription for Journavx, follow up in 6 weeks time

## 2025-07-18 ENCOUNTER — PATIENT MESSAGE (OUTPATIENT)
Dept: ORTHOPEDICS | Facility: CLINIC | Age: 70
End: 2025-07-18
Payer: MEDICARE

## 2025-07-18 RX ORDER — SUZETRIGINE 50 MG/1
50 TABLET, FILM COATED ORAL 2 TIMES DAILY
Qty: 60 TABLET | Refills: 1 | Status: SHIPPED | OUTPATIENT
Start: 2025-07-18

## 2025-07-21 ENCOUNTER — PATIENT MESSAGE (OUTPATIENT)
Dept: ORTHOPEDICS | Facility: CLINIC | Age: 70
End: 2025-07-21
Payer: MEDICARE

## 2025-07-22 ENCOUNTER — CLINICAL SUPPORT (OUTPATIENT)
Dept: REHABILITATION | Facility: HOSPITAL | Age: 70
End: 2025-07-22
Attending: ORTHOPAEDIC SURGERY
Payer: MEDICARE

## 2025-07-22 DIAGNOSIS — M25.661 DECREASED RANGE OF MOTION (ROM) OF RIGHT KNEE: ICD-10-CM

## 2025-07-22 DIAGNOSIS — M25.561 CHRONIC PAIN OF RIGHT KNEE: Primary | ICD-10-CM

## 2025-07-22 DIAGNOSIS — G89.29 CHRONIC PAIN OF RIGHT KNEE: Primary | ICD-10-CM

## 2025-07-22 DIAGNOSIS — Z96.651 STATUS POST TOTAL RIGHT KNEE REPLACEMENT: ICD-10-CM

## 2025-07-22 DIAGNOSIS — R26.9 ABNORMAL GAIT: ICD-10-CM

## 2025-07-22 PROCEDURE — 97530 THERAPEUTIC ACTIVITIES: CPT | Mod: PO

## 2025-07-22 NOTE — PROGRESS NOTES
Outpatient Rehab    Physical Therapy Progress Note    Patient Name: Martha Altman  MRN: [id number]  YOB: 1955  Encounter Date: 7/22/2025    Therapy Diagnosis:   Encounter Diagnoses   Name Primary?    Chronic pain of right knee Yes    Status post total right knee replacement     Decreased range of motion (ROM) of right knee     Abnormal gait      Physician: Romulo Lange MD    Physician Orders: Eval and Treat  Medical Diagnosis: Status post total right knee replacement  Traumatic wound dehiscence, initial encounter  Surgical Diagnosis: Not applicable for this Episode   Surgical Date: 6/3/2025  Days Since Last Surgery: 49    Visit # / Visits Authorized:  10 / 20  Insurance Authorization Period: 6/23/2025 to 9/15/2025  Date of Evaluation: 6/23/2025  Plan of Care Certification: 6/23/2025 to 9/15/2025      PT/PTA:     Number of PTA visits since last PT visit:   Time In: 1100   Time Out: 1154  Total Time (in minutes): 54   Total Billable Time (in minutes): 24    FOTO:  Intake Score (%): 54  Survey Score 2 (%): 59  Survey Score 3 (%): Not applicable for this Episode    Precautions:       Subjective   Pt reports that she is okay today. Feels her R knee is at a plateau because she is still limping. States that she doesn't have as much pain, but has stiffness..  Pain reported as 0/10. R knee    Objective   Knee Observations  Right Knee Observations  Present: Straight Leg Raise Extensor Lag             Knee Range of Motion   Right Knee   Active (deg) Passive (deg) Pain   Flexion 115 115     Extension 0 1                        Hip Strength - Planes of Motion   Right Strength Right Pain Left Strength Left  Pain   Flexion (L2) 5   5     Extension 4+   4+     ABduction 4+   4+     ADduction           Internal Rotation           External Rotation               Knee Strength   Right Strength Right Pain Left Strength Left  Pain   Flexion (S2)           Prone Flexion           Extension (L3)             Knee  Extensor Lag  Lag Present: Right  20 degree extensor lag on right with SLR. Right knee extension strength = 17lbs; Left knee extension strength = 54lbs           Gait Analysis  Base of Support: Normal  Walking Speed: Decreased    Right Side Walking Observations  Increased: Stance Time       Left Side Walking Observations  Increased: Stance Time  Decreased: Step Length     Knee Observations During Gait  Right: Decreased Knee Flexion Loading Response         Treatment:  Balance/Neuromuscular Re-Education  NMR 1: NMES SLR 10/20 on VMO and RF x6:00  NMR 2: Bridges 3 x 10  NMR 4: Standing hip abduction 2 x 10  NMR 5: LAQ 3 x 10 R LE 2#  NMR 8: Fast LAQs 20x (prior to weighted LAQ)  Therapeutic Activity  TA 3: Sit to stands 3x6 5# DB  TA 5: Time was taken during today's visit to perform reassessment, as well as update goals and objective measurements.    Time Entry(in minutes):  Neuromuscular Re-Education Time Entry: 22  Therapeutic Activity Time Entry: 32    Assessment & Plan   Assessment: Patient presents today for reassessment due to 30 days since her initial evaluation. Since her initial evaluation, she has demonstrated constant improvements in her right knee range of motion. She has achieved full knee extension and has good right knee flexion range of motion. At this time, her largest deficit is in her hip strength and in her knee extension strength. She continues to demonstrate a significant extensor lag with a SLR, as well as a significant knee extension strength deficit compared to her left knee. This extension deficit seems to limit her gait and contributes to her reported gait deficits. Will continue with NMES and quadriceps strengthening to improve her gait and transfer ability.  Evaluation/Treatment Tolerance: Patient tolerated treatment well    The patient will continue to benefit from skilled outpatient physical therapy in order to address the deficits listed in the problem list on the initial evaluation,  provide patient and family education, and maximize the patients level of independence in the home and community environments.     The patient's spiritual, cultural, and educational needs were considered, and the patient is agreeable to the plan of care and goals.     Education  Education was done with Patient. The patient's learning style includes Demonstration and Listening. The patient Demonstrates understanding and Verbalizes understanding.         Patient was educated on exam findings from today's reassessment, as well as progress towards her goals       Plan: Continue with POC    Goals:   Active       B) LTGs       FOTO       Start:  06/23/25    Expected End:  09/15/25       Patient will improve their FOTO score to >/= 78 to demonstrate clinically significant improvements in function and ADL performance          Pain       Start:  06/23/25    Expected End:  09/15/25       Patient will report worst pain in her right knee as </= 1/10 to impact her tolerance to performing physical activity         range of motion       Start:  06/23/25    Expected End:  09/15/25       Patient will improve her right knee flexion AROM to >/= 120 degrees to impact her ability to ascend/descend stairs         Strength       Start:  06/23/25    Expected End:  09/15/25       Patient will improve their R knee manual muscle test score to 5/5 to demonstrate improvements in functional strength          Transfers       Start:  06/23/25    Expected End:  09/15/25       Patient will improve her 30 second sit to stand test to >/= 9 repetitions to demonstrate improvements in functional LE strength            Resolved       A) STGs       HEP (Met)       Start:  06/23/25    Expected End:  07/21/25    Resolved:  07/22/25    Pt will be independent and compliant with her HEP to impact their progress towards their goals           Pain (Met)       Start:  06/23/25    Expected End:  07/21/25    Resolved:  07/22/25    Patient will report their resting,  average right knee pain as </= 0/10 to impact their QoL.          range of motion (Met)       Start:  06/23/25    Expected End:  07/21/25    Resolved:  07/22/25    Patient will improve her right knee flexion PROM to >/= 115 degrees to impact her bed mobility         Strength  (Met)       Start:  06/23/25    Expected End:  07/21/25    Resolved:  07/22/25    Patient will improve their right knee manual muscle test score to 4/5 to demonstrate improvements in functional strength          Gait (Met)       Start:  06/23/25    Expected End:  07/21/25    Resolved:  07/22/25    Patient will be able to ambulate community and household distances without need for SPC to demonstrate improvements in her confidence in her balance             Jake Watson, PT

## 2025-07-24 ENCOUNTER — CLINICAL SUPPORT (OUTPATIENT)
Dept: REHABILITATION | Facility: HOSPITAL | Age: 70
End: 2025-07-24
Attending: ORTHOPAEDIC SURGERY
Payer: MEDICARE

## 2025-07-24 DIAGNOSIS — M25.661 DECREASED RANGE OF MOTION (ROM) OF RIGHT KNEE: ICD-10-CM

## 2025-07-24 DIAGNOSIS — Z96.651 STATUS POST TOTAL RIGHT KNEE REPLACEMENT: ICD-10-CM

## 2025-07-24 DIAGNOSIS — R26.9 ABNORMAL GAIT: ICD-10-CM

## 2025-07-24 DIAGNOSIS — M25.561 CHRONIC PAIN OF RIGHT KNEE: Primary | ICD-10-CM

## 2025-07-24 DIAGNOSIS — G89.29 CHRONIC PAIN OF RIGHT KNEE: Primary | ICD-10-CM

## 2025-07-24 PROCEDURE — 97110 THERAPEUTIC EXERCISES: CPT | Mod: PO,CQ

## 2025-07-24 PROCEDURE — 97112 NEUROMUSCULAR REEDUCATION: CPT | Mod: PO,CQ

## 2025-07-24 PROCEDURE — 97530 THERAPEUTIC ACTIVITIES: CPT | Mod: PO,CQ

## 2025-07-24 NOTE — PROGRESS NOTES
"Outpatient Rehab    Physical Therapy Visit    Patient Name: Martha Altman  MRN: [id number]  YOB: 1955  Encounter Date: 7/24/2025    Therapy Diagnosis:   Encounter Diagnoses   Name Primary?    Chronic pain of right knee Yes    Status post total right knee replacement      Decreased range of motion (ROM) of right knee      Abnormal gait      Physician: Romulo Lange MD    Physician Orders: Eval and Treat  Medical Diagnosis: Status post total right knee replacement  Traumatic wound dehiscence, initial encounter  Surgical Diagnosis: Not applicable for this Episode   Surgical Date: 6/3/2025  Days Since Last Surgery: 51    Visit # / Visits Authorized:  11 / 20  Insurance Authorization Period: 6/23/2025 to 9/15/2025  Date of Evaluation: 6/23/2025  Plan of Care Certification: 6/23/2025 to 9/15/2025      PT/PTA: PTA   Number of PTA visits since last PT visit:1  Time In: 1055   Time Out: 1150  Total Time (in minutes): 55   Total Billable Time (in minutes): 55    FOTO:  Intake Score (%): 54  Survey Score 2 (%): 59  Survey Score 3 (%): Not applicable for this Episode    Precautions:         Subjective   Pt notes she has been more aware of gait since last visit to attempt to reduce limping. She reports min R knee pain levels currenlty.  Pain reported as 2/10.      Objective            Treatment:  Therapeutic Exercise  TE 2: Supine heel slides x 20 5" holds  TE 3: Knee ext heel prop 4# x 3 minutes  TE 4: Gastroc stretch on incline board 3 x 30"  TE 5: Hamstring stretch 3 x 30"  TE 6: Recumbent bike 8 mins (full revolutions)  Balance/Neuromuscular Re-Education  NMR 1: NMES SLR 10/20 on VMO and RF x6:00  NMR 2: Bridges 3 x 10  NMR 3: Quad sets x 30 3" holds  NMR 4: Standing hip abduction 2 x 10  NMR 5: LAQ 3 x 10 R LE 2#  NMR 6: Standing TKE YTB (tactile/vc cueing for quad contraction w/ TKE, full WB) 2x15  NMR 7: Leg press 20x 2x10(tactile/vc cueing for quad contraction with TKE) 30#  NMR 8: Fast LAQs 20x " (prior to weighted LAQ)  Therapeutic Activity  TA 1: Standing heel raises 2x15  TA 3: Sit to stands 3x6 5# DB    Time Entry(in minutes):  Neuromuscular Re-Education Time Entry: 27  Therapeutic Activity Time Entry: 8  Therapeutic Exercise Time Entry: 20    Assessment & Plan   Assessment: Martha demonstrated improved functional knee mobility/ROM by achieving full revolutions on recumbent bike today. NMS continued to assist with quad activation with SLR today. Extensor lag is still evident with SLR with electrical stimulation. Increased workload today by increasing standing and machine based strengthening. Good tolerance without adverse effects. Will continue to progress per pt's tolerance       The patient will continue to benefit from skilled outpatient physical therapy in order to address the deficits listed in the problem list on the initial evaluation, provide patient and family education, and maximize the patients level of independence in the home and community environments.     The patient's spiritual, cultural, and educational needs were considered, and the patient is agreeable to the plan of care and goals.           Plan: Continue with POC    Goals:   Active       B) LTGs       FOTO       Start:  06/23/25    Expected End:  09/15/25       Patient will improve their FOTO score to >/= 78 to demonstrate clinically significant improvements in function and ADL performance          Pain       Start:  06/23/25    Expected End:  09/15/25       Patient will report worst pain in her right knee as </= 1/10 to impact her tolerance to performing physical activity         range of motion       Start:  06/23/25    Expected End:  09/15/25       Patient will improve her right knee flexion AROM to >/= 120 degrees to impact her ability to ascend/descend stairs         Strength       Start:  06/23/25    Expected End:  09/15/25       Patient will improve their R knee manual muscle test score to 5/5 to demonstrate improvements in  functional strength          Transfers       Start:  06/23/25    Expected End:  09/15/25       Patient will improve her 30 second sit to stand test to >/= 9 repetitions to demonstrate improvements in functional LE strength            Resolved       A) STGs       HEP (Met)       Start:  06/23/25    Expected End:  07/21/25    Resolved:  07/22/25    Pt will be independent and compliant with her HEP to impact their progress towards their goals           Pain (Met)       Start:  06/23/25    Expected End:  07/21/25    Resolved:  07/22/25    Patient will report their resting, average right knee pain as </= 0/10 to impact their QoL.          range of motion (Met)       Start:  06/23/25    Expected End:  07/21/25    Resolved:  07/22/25    Patient will improve her right knee flexion PROM to >/= 115 degrees to impact her bed mobility         Strength  (Met)       Start:  06/23/25    Expected End:  07/21/25    Resolved:  07/22/25    Patient will improve their right knee manual muscle test score to 4/5 to demonstrate improvements in functional strength          Gait (Met)       Start:  06/23/25    Expected End:  07/21/25    Resolved:  07/22/25    Patient will be able to ambulate community and household distances without need for SPC to demonstrate improvements in her confidence in her balance             Rico Gagnon, PTA

## 2025-07-29 ENCOUNTER — CLINICAL SUPPORT (OUTPATIENT)
Dept: REHABILITATION | Facility: HOSPITAL | Age: 70
End: 2025-07-29
Attending: ORTHOPAEDIC SURGERY
Payer: MEDICARE

## 2025-07-29 DIAGNOSIS — R26.9 ABNORMAL GAIT: ICD-10-CM

## 2025-07-29 DIAGNOSIS — M25.661 DECREASED RANGE OF MOTION (ROM) OF RIGHT KNEE: ICD-10-CM

## 2025-07-29 DIAGNOSIS — Z96.651 STATUS POST TOTAL RIGHT KNEE REPLACEMENT: ICD-10-CM

## 2025-07-29 DIAGNOSIS — G89.29 CHRONIC PAIN OF RIGHT KNEE: Primary | ICD-10-CM

## 2025-07-29 DIAGNOSIS — M25.561 CHRONIC PAIN OF RIGHT KNEE: Primary | ICD-10-CM

## 2025-07-29 PROCEDURE — 97110 THERAPEUTIC EXERCISES: CPT | Mod: PO,CQ

## 2025-07-29 PROCEDURE — 97112 NEUROMUSCULAR REEDUCATION: CPT | Mod: PO,CQ

## 2025-07-29 NOTE — PROGRESS NOTES
"Outpatient Rehab    Physical Therapy Visit    Patient Name: Martha Altman  MRN: [id number]  YOB: 1955  Encounter Date: 7/29/2025    Therapy Diagnosis:   Encounter Diagnoses   Name Primary?    Chronic pain of right knee Yes    Status post total right knee replacement      Decreased range of motion (ROM) of right knee      Abnormal gait      Physician: Romulo Lange MD    Physician Orders: Eval and Treat  Medical Diagnosis: Status post total right knee replacement  Traumatic wound dehiscence, initial encounter  Surgical Diagnosis: Not applicable for this Episode   Surgical Date: 6/3/2025  Days Since Last Surgery: 56    Visit # / Visits Authorized:  12 / 20  Insurance Authorization Period: 6/23/2025 to 9/15/2025  Date of Evaluation: 6/23/2025  Plan of Care Certification: 6/23/2025 to 9/15/2025      PT/PTA: PTA   Number of PTA visits since last PT visit:2  Time In: 1300   Time Out: 1400  Total Time (in minutes): 60   Total Billable Time (in minutes): 30    FOTO:  Intake Score (%): 54  Survey Score 2 (%): 59  Survey Score 3 (%): Not applicable for this Episode    Precautions:         Subjective   Pt reports continued min R knee pain levels. She notes "smoother" gait since increasing awareness of heel strike and extending knee while walking.  Pain reported as 2/10.      Objective            Treatment:  Therapeutic Exercise  TE 2: Supine heel slides x 20 5" holds  TE 3: Knee ext heel prop 4# x 3 minutes  TE 4: Gastroc stretch on incline board 3 x 30"  TE 5: Hamstring stretch 3 x 30"  TE 6: Recumbent bike 8 mins (full revolutions)  Balance/Neuromuscular Re-Education  NMR 1: NMES SLR 10/20 on VMO and RF x6:00  NMR 2: Bridges 3 x 10  NMR 3: Quad sets x 30 3" holds  NMR 4: Standing hip abduction 2 x 10  NMR 5: LAQ 3 x 10 R LE 2#  NMR 6: Standing TKE YTB (tactile/vc cueing for quad contraction w/ TKE, full WB) 2x15  NMR 7: Leg press 20x 2x10(tactile/vc cueing for quad contraction with TKE) 30#  NMR 8: Fast " LAQs 20x (prior to weighted LAQ)  Therapeutic Activity  TA 1: Standing heel raises 2x15  TA 3: Sit to stands 3x6 5# DB    Time Entry(in minutes):  Neuromuscular Re-Education Time Entry: 30  Therapeutic Activity Time Entry: 8  Therapeutic Exercise Time Entry: 22    Assessment & Plan   Assessment: Extensor lag still evident during SLR even with aid of electrical stimulation. Pt was able to better isolate quad activation and decrease hip extension during quad sets with towel under heel as opposed to under the knee. Overall good tolerance without increase in pain and appropriate increase in muscular fatigue. Will continue to progress per pt's tolerance.       The patient will continue to benefit from skilled outpatient physical therapy in order to address the deficits listed in the problem list on the initial evaluation, provide patient and family education, and maximize the patients level of independence in the home and community environments.     The patient's spiritual, cultural, and educational needs were considered, and the patient is agreeable to the plan of care and goals.           Plan: Continue with POC    Goals:   Active       B) LTGs       FOTO       Start:  06/23/25    Expected End:  09/15/25       Patient will improve their FOTO score to >/= 78 to demonstrate clinically significant improvements in function and ADL performance          Pain       Start:  06/23/25    Expected End:  09/15/25       Patient will report worst pain in her right knee as </= 1/10 to impact her tolerance to performing physical activity         range of motion       Start:  06/23/25    Expected End:  09/15/25       Patient will improve her right knee flexion AROM to >/= 120 degrees to impact her ability to ascend/descend stairs         Strength       Start:  06/23/25    Expected End:  09/15/25       Patient will improve their R knee manual muscle test score to 5/5 to demonstrate improvements in functional strength          Transfers        Start:  06/23/25    Expected End:  09/15/25       Patient will improve her 30 second sit to stand test to >/= 9 repetitions to demonstrate improvements in functional LE strength            Resolved       A) STGs       HEP (Met)       Start:  06/23/25    Expected End:  07/21/25    Resolved:  07/22/25    Pt will be independent and compliant with her HEP to impact their progress towards their goals           Pain (Met)       Start:  06/23/25    Expected End:  07/21/25    Resolved:  07/22/25    Patient will report their resting, average right knee pain as </= 0/10 to impact their QoL.          range of motion (Met)       Start:  06/23/25    Expected End:  07/21/25    Resolved:  07/22/25    Patient will improve her right knee flexion PROM to >/= 115 degrees to impact her bed mobility         Strength  (Met)       Start:  06/23/25    Expected End:  07/21/25    Resolved:  07/22/25    Patient will improve their right knee manual muscle test score to 4/5 to demonstrate improvements in functional strength          Gait (Met)       Start:  06/23/25    Expected End:  07/21/25    Resolved:  07/22/25    Patient will be able to ambulate community and household distances without need for SPC to demonstrate improvements in her confidence in her balance             Rico Gagnon, PTA

## 2025-07-30 ENCOUNTER — PATIENT MESSAGE (OUTPATIENT)
Dept: ORTHOPEDICS | Facility: CLINIC | Age: 70
End: 2025-07-30
Payer: MEDICARE

## 2025-07-31 ENCOUNTER — CLINICAL SUPPORT (OUTPATIENT)
Dept: REHABILITATION | Facility: HOSPITAL | Age: 70
End: 2025-07-31
Attending: ORTHOPAEDIC SURGERY
Payer: MEDICARE

## 2025-07-31 DIAGNOSIS — R26.9 ABNORMAL GAIT: ICD-10-CM

## 2025-07-31 DIAGNOSIS — Z96.651 STATUS POST TOTAL RIGHT KNEE REPLACEMENT: ICD-10-CM

## 2025-07-31 DIAGNOSIS — G89.29 CHRONIC PAIN OF RIGHT KNEE: Primary | ICD-10-CM

## 2025-07-31 DIAGNOSIS — M25.561 CHRONIC PAIN OF RIGHT KNEE: Primary | ICD-10-CM

## 2025-07-31 DIAGNOSIS — M25.661 DECREASED RANGE OF MOTION (ROM) OF RIGHT KNEE: ICD-10-CM

## 2025-07-31 PROCEDURE — 97110 THERAPEUTIC EXERCISES: CPT | Mod: PO,CQ

## 2025-07-31 PROCEDURE — 97112 NEUROMUSCULAR REEDUCATION: CPT | Mod: PO,CQ

## 2025-07-31 NOTE — PROGRESS NOTES
"Outpatient Rehab    Physical Therapy Visit    Patient Name: Martha Altman  MRN: [id number]  YOB: 1955  Encounter Date: 7/31/2025    Therapy Diagnosis:   Encounter Diagnoses   Name Primary?    Chronic pain of right knee Yes    Status post total right knee replacement      Decreased range of motion (ROM) of right knee      Abnormal gait      Physician: Romulo Lange MD    Physician Orders: Eval and Treat  Medical Diagnosis: Status post total right knee replacement  Traumatic wound dehiscence, initial encounter  Surgical Diagnosis: Not applicable for this Episode   Surgical Date: 6/3/2025  Days Since Last Surgery: 58    Visit # / Visits Authorized:  13 / 20  Insurance Authorization Period: 6/23/2025 to 9/15/2025  Date of Evaluation: 6/23/2025  Plan of Care Certification: 6/23/2025 to 9/15/2025      PT/PTA: PTA   Number of PTA visits since last PT visit:3  Time In: 1055   Time Out: 1155  Total Time (in minutes): 60   Total Billable Time (in minutes): 40    FOTO:  Intake Score (%): 54  Survey Score 2 (%): 59  Survey Score 3 (%): Not applicable for this Episode    Precautions:         Subjective   Pt reports increased soreness and stiffness following increased walking while attending an event last night.  Pain reported as 2/10.      Objective            Treatment:  Therapeutic Exercise  TE 1: Nu Step x 8 min  TE 2: Supine heel slides x 20 5" holds  TE 4: Gastroc stretch on incline board 3 x 30"  TE 5: Hamstring stretch 3 x 30"  Balance/Neuromuscular Re-Education  NMR 2: Bridges with bolster under knee for increased quad activation 3 x 10  NMR 3: closed chained Quad sets x 30 3" holds w/therapist over pressure at foot  NMR 4: SAQ 3 x 10  NMR 5: SAQ+SLR 3 x 10  NMR 9: LAQ w/assist from contralateral LE + eccentric lowering 3 x 10    Time Entry(in minutes):  Neuromuscular Re-Education Time Entry: 40  Therapeutic Exercise Time Entry: 20    Assessment & Plan   Assessment: Heavy emphasis on quad activation " as pt continued to demonstrate extensor lag with SLR during previous visits. Closed chained activities helped inhibit quad musculature. Pt seems to have difficulty overcoming hamstring co activation with traditional quad set and SLR exercises. Eccentric control of quad was also introduced today with addition of eccentric lowering of LAQ. Good tolerance with improved quad activation and appropriate increase in muscular fatigue. Will monitor and attempt to progress per pt's tolerance.        The patient will continue to benefit from skilled outpatient physical therapy in order to address the deficits listed in the problem list on the initial evaluation, provide patient and family education, and maximize the patients level of independence in the home and community environments.     The patient's spiritual, cultural, and educational needs were considered, and the patient is agreeable to the plan of care and goals.           Plan: Continue with POC    Goals:   Active       B) LTGs       FOTO       Start:  06/23/25    Expected End:  09/15/25       Patient will improve their FOTO score to >/= 78 to demonstrate clinically significant improvements in function and ADL performance          Pain       Start:  06/23/25    Expected End:  09/15/25       Patient will report worst pain in her right knee as </= 1/10 to impact her tolerance to performing physical activity         range of motion       Start:  06/23/25    Expected End:  09/15/25       Patient will improve her right knee flexion AROM to >/= 120 degrees to impact her ability to ascend/descend stairs         Strength       Start:  06/23/25    Expected End:  09/15/25       Patient will improve their R knee manual muscle test score to 5/5 to demonstrate improvements in functional strength          Transfers       Start:  06/23/25    Expected End:  09/15/25       Patient will improve her 30 second sit to stand test to >/= 9 repetitions to demonstrate improvements in  functional LE strength            Resolved       A) STGs       HEP (Met)       Start:  06/23/25    Expected End:  07/21/25    Resolved:  07/22/25    Pt will be independent and compliant with her HEP to impact their progress towards their goals           Pain (Met)       Start:  06/23/25    Expected End:  07/21/25    Resolved:  07/22/25    Patient will report their resting, average right knee pain as </= 0/10 to impact their QoL.          range of motion (Met)       Start:  06/23/25    Expected End:  07/21/25    Resolved:  07/22/25    Patient will improve her right knee flexion PROM to >/= 115 degrees to impact her bed mobility         Strength  (Met)       Start:  06/23/25    Expected End:  07/21/25    Resolved:  07/22/25    Patient will improve their right knee manual muscle test score to 4/5 to demonstrate improvements in functional strength          Gait (Met)       Start:  06/23/25    Expected End:  07/21/25    Resolved:  07/22/25    Patient will be able to ambulate community and household distances without need for SPC to demonstrate improvements in her confidence in her balance             Rico Gagnon, PTA

## 2025-08-05 ENCOUNTER — CLINICAL SUPPORT (OUTPATIENT)
Dept: REHABILITATION | Facility: HOSPITAL | Age: 70
End: 2025-08-05
Attending: ORTHOPAEDIC SURGERY
Payer: MEDICARE

## 2025-08-05 DIAGNOSIS — Z96.651 STATUS POST TOTAL RIGHT KNEE REPLACEMENT: ICD-10-CM

## 2025-08-05 DIAGNOSIS — G89.29 CHRONIC PAIN OF RIGHT KNEE: Primary | ICD-10-CM

## 2025-08-05 DIAGNOSIS — R26.9 ABNORMAL GAIT: ICD-10-CM

## 2025-08-05 DIAGNOSIS — M25.661 DECREASED RANGE OF MOTION (ROM) OF RIGHT KNEE: ICD-10-CM

## 2025-08-05 DIAGNOSIS — M25.561 CHRONIC PAIN OF RIGHT KNEE: Primary | ICD-10-CM

## 2025-08-05 PROCEDURE — 97530 THERAPEUTIC ACTIVITIES: CPT | Mod: PO

## 2025-08-05 PROCEDURE — 97112 NEUROMUSCULAR REEDUCATION: CPT | Mod: PO

## 2025-08-05 PROCEDURE — 97110 THERAPEUTIC EXERCISES: CPT | Mod: PO

## 2025-08-05 NOTE — PROGRESS NOTES
"  Outpatient Rehab    Physical Therapy Visit    Patient Name: Martha Altman  MRN: [id number]  YOB: 1955  Encounter Date: 8/5/2025    Therapy Diagnosis:   Encounter Diagnoses   Name Primary?    Chronic pain of right knee Yes    Decreased range of motion (ROM) of right knee     Status post total right knee replacement     Abnormal gait      Physician: Romulo Lange MD    Physician Orders: Eval and Treat  Medical Diagnosis: Status post total right knee replacement  Traumatic wound dehiscence, initial encounter  Surgical Diagnosis: Not applicable for this Episode   Surgical Date: 6/3/2025  Days Since Last Surgery: 63    Visit # / Visits Authorized:  14 / 20  Insurance Authorization Period: 6/23/2025 to 9/15/2025  Date of Evaluation: 6/23/2025  Plan of Care Certification: 6/23/2025 to 9/15/2025      PT/PTA:     Number of PTA visits since last PT visit:   Time In: 1051   Time Out: 1147  Total Time (in minutes): 56   Total Billable Time (in minutes): 56    FOTO:  Intake Score (%): 54  Survey Score 2 (%): 59  Survey Score 3 (%): Not applicable for this Episode    Precautions:         Subjective   Pt reports that she hurt her L knee over the weekend but isn't sure how. States that her R knee just feels tight. Feels more strength and stability in her knee..  Pain reported as 1/10. R knee    Objective            Treatment:  Therapeutic Exercise  TE 1: Recumbent bike 8 min level 3  TE 2: Supine heel slides x 20 5" holds  TE 4: Gastroc stretch on incline board 3 x 30"  Balance/Neuromuscular Re-Education  NMR 4: SAQ 3 x 10  NMR 6: Standing TKE with blue ball (tactile/vc cueing for quad contraction w/ TKE, full WB) 2x15  NMR 8: Fast LAQs 20x (fast con, slow eccentric) (prior to weighted LAQ)  NMR 9: LAQ w/assist from contralateral LE + eccentric lowering 3 x 10 with 2#  NMR 10: Backwards sled pull with empty sled for TKE 1 lap  Therapeutic Activity  TA 1: Standing heel raises 2x15  TA 2: Standing marches/hip " "abd/hip ext 2 x 10 ea  TA 3: Sit to stands 3x6 5# DB  TA 4: Step ups 4" step 2 x 12  TA 6: Mini lunge with R foot forward 3x5 with UE support    Time Entry(in minutes):  Neuromuscular Re-Education Time Entry: 20  Therapeutic Activity Time Entry: 20  Therapeutic Exercise Time Entry: 16    Assessment & Plan   Assessment: Patient continues to demonstrate extensor lag with SLR 2/2 quadriceps weakness. Utilizes RF as compensation for distal quadricep weakness. Today's exercises emphasized quadriceps strengthening through concentric and eccentric contractions, as well as AAROM through full range of motion. She seems to utilize a knee extension thrust to create stability and compensate for her quadriceps weakness.   Evaluation/Treatment Tolerance: Patient tolerated treatment well    The patient will continue to benefit from skilled outpatient physical therapy in order to address the deficits listed in the problem list on the initial evaluation, provide patient and family education, and maximize the patients level of independence in the home and community environments.     The patient's spiritual, cultural, and educational needs were considered, and the patient is agreeable to the plan of care and goals.           Plan: Continue with POC    Goals:   Active       B) LTGs       FOTO       Start:  06/23/25    Expected End:  09/15/25       Patient will improve their FOTO score to >/= 78 to demonstrate clinically significant improvements in function and ADL performance          Pain       Start:  06/23/25    Expected End:  09/15/25       Patient will report worst pain in her right knee as </= 1/10 to impact her tolerance to performing physical activity         range of motion       Start:  06/23/25    Expected End:  09/15/25       Patient will improve her right knee flexion AROM to >/= 120 degrees to impact her ability to ascend/descend stairs         Strength       Start:  06/23/25    Expected End:  09/15/25       Patient will " improve their R knee manual muscle test score to 5/5 to demonstrate improvements in functional strength          Transfers       Start:  06/23/25    Expected End:  09/15/25       Patient will improve her 30 second sit to stand test to >/= 9 repetitions to demonstrate improvements in functional LE strength            Resolved       A) STGs       HEP (Met)       Start:  06/23/25    Expected End:  07/21/25    Resolved:  07/22/25    Pt will be independent and compliant with her HEP to impact their progress towards their goals           Pain (Met)       Start:  06/23/25    Expected End:  07/21/25    Resolved:  07/22/25    Patient will report their resting, average right knee pain as </= 0/10 to impact their QoL.          range of motion (Met)       Start:  06/23/25    Expected End:  07/21/25    Resolved:  07/22/25    Patient will improve her right knee flexion PROM to >/= 115 degrees to impact her bed mobility         Strength  (Met)       Start:  06/23/25    Expected End:  07/21/25    Resolved:  07/22/25    Patient will improve their right knee manual muscle test score to 4/5 to demonstrate improvements in functional strength          Gait (Met)       Start:  06/23/25    Expected End:  07/21/25    Resolved:  07/22/25    Patient will be able to ambulate community and household distances without need for SPC to demonstrate improvements in her confidence in her balance             Jake Watson, PT

## 2025-08-07 ENCOUNTER — CLINICAL SUPPORT (OUTPATIENT)
Dept: REHABILITATION | Facility: HOSPITAL | Age: 70
End: 2025-08-07
Attending: ORTHOPAEDIC SURGERY
Payer: MEDICARE

## 2025-08-07 DIAGNOSIS — R26.9 ABNORMAL GAIT: ICD-10-CM

## 2025-08-07 DIAGNOSIS — G89.29 CHRONIC PAIN OF RIGHT KNEE: Primary | ICD-10-CM

## 2025-08-07 DIAGNOSIS — Z96.651 STATUS POST TOTAL RIGHT KNEE REPLACEMENT: ICD-10-CM

## 2025-08-07 DIAGNOSIS — M25.561 CHRONIC PAIN OF RIGHT KNEE: Primary | ICD-10-CM

## 2025-08-07 DIAGNOSIS — M25.661 DECREASED RANGE OF MOTION (ROM) OF RIGHT KNEE: ICD-10-CM

## 2025-08-07 PROCEDURE — 97112 NEUROMUSCULAR REEDUCATION: CPT | Mod: PO

## 2025-08-07 PROCEDURE — 97530 THERAPEUTIC ACTIVITIES: CPT | Mod: PO

## 2025-08-07 PROCEDURE — 97140 MANUAL THERAPY 1/> REGIONS: CPT | Mod: PO

## 2025-08-07 PROCEDURE — 97110 THERAPEUTIC EXERCISES: CPT | Mod: PO

## 2025-08-07 NOTE — PROGRESS NOTES
"  Outpatient Rehab    Physical Therapy Visit    Patient Name: Martha Altman  MRN: [id number]  YOB: 1955  Encounter Date: 8/7/2025    Therapy Diagnosis:   Encounter Diagnoses   Name Primary?    Chronic pain of right knee Yes    Decreased range of motion (ROM) of right knee     Status post total right knee replacement     Abnormal gait      Physician: Romulo Lange MD    Physician Orders: Eval and Treat  Medical Diagnosis: Status post total right knee replacement  Traumatic wound dehiscence, initial encounter  Surgical Diagnosis: Not applicable for this Episode   Surgical Date: 6/3/2025  Days Since Last Surgery: 65    Visit # / Visits Authorized:  15 / 20  Insurance Authorization Period: 6/23/2025 to 9/15/2025  Date of Evaluation: 6/23/2025  Plan of Care Certification: 6/23/2025 to 9/15/2025      PT/PTA:     Number of PTA visits since last PT visit:   Time In: 1055   Time Out: 1149  Total Time (in minutes): 54   Total Billable Time (in minutes): 54    FOTO:  Intake Score (%): 54  Survey Score 2 (%): 59  Survey Score 3 (%): Not applicable for this Episode    Precautions:         Subjective   Pt reports that her R knee is doing okay, but her L knee is still bothering her..  Pain reported as 1/10. R knee    Objective            Treatment:  Therapeutic Exercise  TE 2: Supine heel slides x 20 5" holds  TE 4: Gastroc stretch on incline board 3 x 30"  TE 5: Hamstring stretch 3 x 30"  Manual Therapy  MT 1: TF distraction in short sitting at EOB  Balance/Neuromuscular Re-Education  NMR 4: SAQ 3 x 10  NMR 6: Standing TKE with blue ball (tactile/vc cueing for quad contraction w/ TKE, full WB) 2x15  NMR 8: Fast LAQs 20x (fast con, slow eccentric) (prior to weighted LAQ)  NMR 9: LAQ w/assist from contralateral LE + eccentric lowering 3 x 10 with 2#  Therapeutic Activity  TA 1: Standing heel raises 2x15  TA 2: Standing marches/hip abd/hip ext 2 x 10 ea  TA 3: Sit to stands 3x6 5# DB  TA 4: Step ups 4" step 2 x " 12  TA 6: Mini lunge with R foot forward 3x5 with UE support    Time Entry(in minutes):  Manual Therapy Time Entry: 8  Neuromuscular Re-Education Time Entry: 19  Therapeutic Activity Time Entry: 15  Therapeutic Exercise Time Entry: 11    Assessment & Plan   Assessment: Patient continues to demonstrate extensor lag with SLR 2/2 quadriceps weakness. Utilizes RF as compensation for distal quadricep weakness. Today's exercises emphasized quadriceps strengthening through concentric and eccentric contractions, as well as AAROM through full range of motion. She seems to utilize a knee extension thrust to create stability and compensate for her quadriceps weakness. Less patient assistance needed during SAQs during today's visit to achieve knee extension. Will continue with quadriceps strengthening program to improve stability during gait.  Evaluation/Treatment Tolerance: Patient tolerated treatment well    The patient will continue to benefit from skilled outpatient physical therapy in order to address the deficits listed in the problem list on the initial evaluation, provide patient and family education, and maximize the patients level of independence in the home and community environments.     The patient's spiritual, cultural, and educational needs were considered, and the patient is agreeable to the plan of care and goals.           Plan: Continue with POC    Goals:   Active       B) LTGs       FOTO       Start:  06/23/25    Expected End:  09/15/25       Patient will improve their FOTO score to >/= 78 to demonstrate clinically significant improvements in function and ADL performance          Pain       Start:  06/23/25    Expected End:  09/15/25       Patient will report worst pain in her right knee as </= 1/10 to impact her tolerance to performing physical activity         range of motion       Start:  06/23/25    Expected End:  09/15/25       Patient will improve her right knee flexion AROM to >/= 120 degrees to  impact her ability to ascend/descend stairs         Strength       Start:  06/23/25    Expected End:  09/15/25       Patient will improve their R knee manual muscle test score to 5/5 to demonstrate improvements in functional strength          Transfers       Start:  06/23/25    Expected End:  09/15/25       Patient will improve her 30 second sit to stand test to >/= 9 repetitions to demonstrate improvements in functional LE strength            Resolved       A) STGs       HEP (Met)       Start:  06/23/25    Expected End:  07/21/25    Resolved:  07/22/25    Pt will be independent and compliant with her HEP to impact their progress towards their goals           Pain (Met)       Start:  06/23/25    Expected End:  07/21/25    Resolved:  07/22/25    Patient will report their resting, average right knee pain as </= 0/10 to impact their QoL.          range of motion (Met)       Start:  06/23/25    Expected End:  07/21/25    Resolved:  07/22/25    Patient will improve her right knee flexion PROM to >/= 115 degrees to impact her bed mobility         Strength  (Met)       Start:  06/23/25    Expected End:  07/21/25    Resolved:  07/22/25    Patient will improve their right knee manual muscle test score to 4/5 to demonstrate improvements in functional strength          Gait (Met)       Start:  06/23/25    Expected End:  07/21/25    Resolved:  07/22/25    Patient will be able to ambulate community and household distances without need for SPC to demonstrate improvements in her confidence in her balance             Jake Watson, PT

## 2025-08-12 ENCOUNTER — CLINICAL SUPPORT (OUTPATIENT)
Dept: REHABILITATION | Facility: HOSPITAL | Age: 70
End: 2025-08-12
Attending: ORTHOPAEDIC SURGERY
Payer: MEDICARE

## 2025-08-12 DIAGNOSIS — G89.29 CHRONIC PAIN OF RIGHT KNEE: Primary | ICD-10-CM

## 2025-08-12 DIAGNOSIS — M25.561 CHRONIC PAIN OF RIGHT KNEE: Primary | ICD-10-CM

## 2025-08-12 DIAGNOSIS — M25.661 DECREASED RANGE OF MOTION (ROM) OF RIGHT KNEE: ICD-10-CM

## 2025-08-12 DIAGNOSIS — Z96.651 STATUS POST TOTAL RIGHT KNEE REPLACEMENT: ICD-10-CM

## 2025-08-12 PROCEDURE — 97530 THERAPEUTIC ACTIVITIES: CPT | Mod: PO

## 2025-08-12 PROCEDURE — 97112 NEUROMUSCULAR REEDUCATION: CPT | Mod: PO

## 2025-08-14 ENCOUNTER — CLINICAL SUPPORT (OUTPATIENT)
Dept: REHABILITATION | Facility: HOSPITAL | Age: 70
End: 2025-08-14
Attending: ORTHOPAEDIC SURGERY
Payer: MEDICARE

## 2025-08-14 DIAGNOSIS — Z96.651 STATUS POST TOTAL RIGHT KNEE REPLACEMENT: ICD-10-CM

## 2025-08-14 DIAGNOSIS — M25.661 DECREASED RANGE OF MOTION (ROM) OF RIGHT KNEE: ICD-10-CM

## 2025-08-14 DIAGNOSIS — R26.9 ABNORMAL GAIT: ICD-10-CM

## 2025-08-14 DIAGNOSIS — M25.561 CHRONIC PAIN OF RIGHT KNEE: Primary | ICD-10-CM

## 2025-08-14 DIAGNOSIS — G89.29 CHRONIC PAIN OF RIGHT KNEE: Primary | ICD-10-CM

## 2025-08-14 PROCEDURE — 97112 NEUROMUSCULAR REEDUCATION: CPT | Mod: PO

## 2025-08-14 PROCEDURE — 97140 MANUAL THERAPY 1/> REGIONS: CPT | Mod: PO

## 2025-08-14 PROCEDURE — 97110 THERAPEUTIC EXERCISES: CPT | Mod: PO

## 2025-08-14 PROCEDURE — 97530 THERAPEUTIC ACTIVITIES: CPT | Mod: PO

## 2025-08-15 ENCOUNTER — DOCUMENT SCAN (OUTPATIENT)
Dept: HOME HEALTH SERVICES | Facility: HOSPITAL | Age: 70
End: 2025-08-15
Payer: MEDICARE

## 2025-08-19 ENCOUNTER — CLINICAL SUPPORT (OUTPATIENT)
Dept: REHABILITATION | Facility: HOSPITAL | Age: 70
End: 2025-08-19
Attending: ORTHOPAEDIC SURGERY
Payer: MEDICARE

## 2025-08-19 DIAGNOSIS — M25.561 CHRONIC PAIN OF RIGHT KNEE: Primary | ICD-10-CM

## 2025-08-19 DIAGNOSIS — M25.661 DECREASED RANGE OF MOTION (ROM) OF RIGHT KNEE: ICD-10-CM

## 2025-08-19 DIAGNOSIS — M62.81 MUSCLE WEAKNESS: ICD-10-CM

## 2025-08-19 DIAGNOSIS — Z96.651 STATUS POST TOTAL RIGHT KNEE REPLACEMENT: ICD-10-CM

## 2025-08-19 DIAGNOSIS — G89.29 CHRONIC PAIN OF RIGHT KNEE: Primary | ICD-10-CM

## 2025-08-19 PROCEDURE — 97112 NEUROMUSCULAR REEDUCATION: CPT | Mod: PO

## 2025-08-19 PROCEDURE — 97110 THERAPEUTIC EXERCISES: CPT | Mod: PO

## 2025-08-19 PROCEDURE — 97530 THERAPEUTIC ACTIVITIES: CPT | Mod: PO

## 2025-08-21 ENCOUNTER — CLINICAL SUPPORT (OUTPATIENT)
Dept: REHABILITATION | Facility: HOSPITAL | Age: 70
End: 2025-08-21
Attending: ORTHOPAEDIC SURGERY
Payer: MEDICARE

## 2025-08-21 DIAGNOSIS — M62.81 MUSCLE WEAKNESS: ICD-10-CM

## 2025-08-21 DIAGNOSIS — Z96.651 STATUS POST TOTAL RIGHT KNEE REPLACEMENT: ICD-10-CM

## 2025-08-21 DIAGNOSIS — G89.29 CHRONIC PAIN OF RIGHT KNEE: Primary | ICD-10-CM

## 2025-08-21 DIAGNOSIS — M25.561 CHRONIC PAIN OF RIGHT KNEE: Primary | ICD-10-CM

## 2025-08-21 DIAGNOSIS — M25.661 DECREASED RANGE OF MOTION (ROM) OF RIGHT KNEE: ICD-10-CM

## 2025-08-21 PROCEDURE — 97112 NEUROMUSCULAR REEDUCATION: CPT | Mod: PO

## 2025-08-21 PROCEDURE — 97530 THERAPEUTIC ACTIVITIES: CPT | Mod: PO

## 2025-08-21 PROCEDURE — 97110 THERAPEUTIC EXERCISES: CPT | Mod: PO

## 2025-08-25 DIAGNOSIS — Z98.890 S/P RIGHT KNEE SURGERY: Primary | ICD-10-CM

## 2025-08-26 ENCOUNTER — CLINICAL SUPPORT (OUTPATIENT)
Dept: REHABILITATION | Facility: HOSPITAL | Age: 70
End: 2025-08-26
Attending: ORTHOPAEDIC SURGERY
Payer: MEDICARE

## 2025-08-26 DIAGNOSIS — Z96.651 STATUS POST TOTAL RIGHT KNEE REPLACEMENT: ICD-10-CM

## 2025-08-26 DIAGNOSIS — M62.81 MUSCLE WEAKNESS: ICD-10-CM

## 2025-08-26 DIAGNOSIS — M25.661 DECREASED RANGE OF MOTION (ROM) OF RIGHT KNEE: Primary | ICD-10-CM

## 2025-08-26 DIAGNOSIS — R26.9 ABNORMAL GAIT: ICD-10-CM

## 2025-08-26 PROCEDURE — 97112 NEUROMUSCULAR REEDUCATION: CPT | Mod: PO

## 2025-08-26 PROCEDURE — 97530 THERAPEUTIC ACTIVITIES: CPT | Mod: PO

## 2025-08-26 PROCEDURE — 97110 THERAPEUTIC EXERCISES: CPT | Mod: PO

## 2025-08-28 ENCOUNTER — OFFICE VISIT (OUTPATIENT)
Dept: ORTHOPEDICS | Facility: CLINIC | Age: 70
End: 2025-08-28
Payer: MEDICARE

## 2025-08-28 ENCOUNTER — HOSPITAL ENCOUNTER (OUTPATIENT)
Dept: RADIOLOGY | Facility: HOSPITAL | Age: 70
Discharge: HOME | End: 2025-08-28
Attending: ORTHOPAEDIC SURGERY
Payer: MEDICARE

## 2025-08-28 ENCOUNTER — CLINICAL SUPPORT (OUTPATIENT)
Dept: REHABILITATION | Facility: HOSPITAL | Age: 70
End: 2025-08-28
Attending: ORTHOPAEDIC SURGERY
Payer: MEDICARE

## 2025-08-28 DIAGNOSIS — M62.81 MUSCLE WEAKNESS: ICD-10-CM

## 2025-08-28 DIAGNOSIS — M25.661 DECREASED RANGE OF MOTION (ROM) OF RIGHT KNEE: Primary | ICD-10-CM

## 2025-08-28 DIAGNOSIS — Z98.890 S/P RIGHT KNEE SURGERY: ICD-10-CM

## 2025-08-28 DIAGNOSIS — Z96.651 STATUS POST TOTAL RIGHT KNEE REPLACEMENT: ICD-10-CM

## 2025-08-28 DIAGNOSIS — Z96.651 STATUS POST TOTAL RIGHT KNEE REPLACEMENT: Primary | ICD-10-CM

## 2025-08-28 DIAGNOSIS — R26.9 ABNORMAL GAIT: ICD-10-CM

## 2025-08-28 PROCEDURE — 97530 THERAPEUTIC ACTIVITIES: CPT | Mod: PO

## 2025-08-28 PROCEDURE — 1159F MED LIST DOCD IN RCRD: CPT | Mod: CPTII,S$GLB,, | Performed by: ORTHOPAEDIC SURGERY

## 2025-08-28 PROCEDURE — 1125F AMNT PAIN NOTED PAIN PRSNT: CPT | Mod: CPTII,S$GLB,, | Performed by: ORTHOPAEDIC SURGERY

## 2025-08-28 PROCEDURE — 99024 POSTOP FOLLOW-UP VISIT: CPT | Mod: S$GLB,,, | Performed by: ORTHOPAEDIC SURGERY

## 2025-08-28 PROCEDURE — 73562 X-RAY EXAM OF KNEE 3: CPT | Mod: TC,PO,RT

## 2025-08-28 PROCEDURE — 99999 PR PBB SHADOW E&M-EST. PATIENT-LVL III: CPT | Mod: PBBFAC,,, | Performed by: ORTHOPAEDIC SURGERY

## 2025-08-28 PROCEDURE — 73562 X-RAY EXAM OF KNEE 3: CPT | Mod: 26,RT,, | Performed by: RADIOLOGY

## 2025-08-28 PROCEDURE — 73560 X-RAY EXAM OF KNEE 1 OR 2: CPT | Mod: 26,LT,, | Performed by: RADIOLOGY

## 2025-09-04 ENCOUNTER — CLINICAL SUPPORT (OUTPATIENT)
Dept: REHABILITATION | Facility: HOSPITAL | Age: 70
End: 2025-09-04
Attending: ORTHOPAEDIC SURGERY
Payer: MEDICARE

## 2025-09-04 DIAGNOSIS — Z96.651 STATUS POST TOTAL RIGHT KNEE REPLACEMENT: ICD-10-CM

## 2025-09-04 DIAGNOSIS — M62.81 MUSCLE WEAKNESS: ICD-10-CM

## 2025-09-04 DIAGNOSIS — M25.661 DECREASED RANGE OF MOTION (ROM) OF RIGHT KNEE: ICD-10-CM

## 2025-09-04 DIAGNOSIS — R26.9 ABNORMAL GAIT: Primary | ICD-10-CM

## 2025-09-04 PROCEDURE — 97530 THERAPEUTIC ACTIVITIES: CPT | Mod: PO

## (undated) DEVICE — LINER GLOVE POWDERFREE 8

## (undated) DEVICE — DRAPE T EXTRM SURG 121X128X90

## (undated) DEVICE — STOCKINET TUBULAR 1 PLY 6X60IN

## (undated) DEVICE — DRAPE INCISE IOBAN 2 23X17IN

## (undated) DEVICE — INTERPULSE SET

## (undated) DEVICE — STRAP OR TABLE 5IN X 72IN

## (undated) DEVICE — SAW BLADE SAGGITAL

## (undated) DEVICE — DRAPE THREE-QTR REINF 53X77IN

## (undated) DEVICE — GLOVE SURG BIOGEL LATEX SZ 7.5

## (undated) DEVICE — Device

## (undated) DEVICE — GLOVE BIOGEL ORTHOPEDIC 7.5

## (undated) DEVICE — PAD CAST SPECIALIST STRL 6

## (undated) DEVICE — PENCIL ROCKER SWITCH 10FT CORD

## (undated) DEVICE — TIP YANKAUERS BULB NO VENT

## (undated) DEVICE — BANDAGE ACE DOUBLE STER 6IN

## (undated) DEVICE — SPONGE LAP 18X18 PREWASHED

## (undated) DEVICE — HANDLE SURG LIGHT NONRIGID

## (undated) DEVICE — SPONGE COTTON TRAY 4X4IN

## (undated) DEVICE — KIT SAHARA DRAPE DRAW/LIFT

## (undated) DEVICE — YANKAUER OPEN TIP W/O VENT

## (undated) DEVICE — DRESSING XEROFORM 1X8IN

## (undated) DEVICE — BNDG COFLEX FOAM LF2 ST 6X5YD

## (undated) DEVICE — TOURNIQUET SB QC DP 34X4IN

## (undated) DEVICE — GOWN TOGA SYS PEELWY ZIP 2 XL

## (undated) DEVICE — SOL IRR NACL .9% 3000ML

## (undated) DEVICE — SUT ETHILON 3-0 FS-1 30

## (undated) DEVICE — BANDAGE ESMARK 6X12

## (undated) DEVICE — TRAY SKIN SCRUB WET PREMIUM

## (undated) DEVICE — SUT 2/0 36IN COATED VICRYL

## (undated) DEVICE — COVER TABLE HVY DTY 60X90IN

## (undated) DEVICE — STAPLER SKIN ROTATING HEAD

## (undated) DEVICE — TOWEL OR DISP STRL BLUE 4/PK

## (undated) DEVICE — BLADE SAW SGTL DL STR 25X1.27X

## (undated) DEVICE — SYS REVOLUTION CEMENT MIXING

## (undated) DEVICE — ELECTRODE REM PLYHSV RETURN 9

## (undated) DEVICE — SUT 1 36IN COATED VICRYL UN

## (undated) DEVICE — BLADE SURG STAINLESS STEEL #10

## (undated) DEVICE — XPERIENCE ADVANCED SURGICAL IRRIGATION